# Patient Record
Sex: MALE | Race: WHITE | NOT HISPANIC OR LATINO
[De-identification: names, ages, dates, MRNs, and addresses within clinical notes are randomized per-mention and may not be internally consistent; named-entity substitution may affect disease eponyms.]

---

## 2017-06-16 ENCOUNTER — APPOINTMENT (OUTPATIENT)
Dept: NUCLEAR MEDICINE | Facility: CLINIC | Age: 77
End: 2017-06-16

## 2017-06-16 ENCOUNTER — OUTPATIENT (OUTPATIENT)
Dept: OUTPATIENT SERVICES | Facility: HOSPITAL | Age: 77
LOS: 1 days | End: 2017-06-16

## 2017-06-16 DIAGNOSIS — Z00.8 ENCOUNTER FOR OTHER GENERAL EXAMINATION: ICD-10-CM

## 2017-06-17 ENCOUNTER — OUTPATIENT (OUTPATIENT)
Dept: OUTPATIENT SERVICES | Facility: HOSPITAL | Age: 77
LOS: 1 days | End: 2017-06-17
Payer: MEDICARE

## 2017-06-17 ENCOUNTER — APPOINTMENT (OUTPATIENT)
Dept: CT IMAGING | Facility: CLINIC | Age: 77
End: 2017-06-17

## 2017-06-17 DIAGNOSIS — C61 MALIGNANT NEOPLASM OF PROSTATE: ICD-10-CM

## 2017-06-17 PROCEDURE — 74178 CT ABD&PLV WO CNTR FLWD CNTR: CPT

## 2017-06-17 PROCEDURE — 82565 ASSAY OF CREATININE: CPT

## 2017-12-22 ENCOUNTER — EMERGENCY (EMERGENCY)
Facility: HOSPITAL | Age: 77
LOS: 1 days | Discharge: HOME | End: 2017-12-22
Attending: EMERGENCY MEDICINE
Payer: MEDICARE

## 2017-12-22 VITALS
OXYGEN SATURATION: 98 % | RESPIRATION RATE: 18 BRPM | HEART RATE: 76 BPM | SYSTOLIC BLOOD PRESSURE: 211 MMHG | DIASTOLIC BLOOD PRESSURE: 90 MMHG | TEMPERATURE: 98 F

## 2017-12-22 VITALS — HEIGHT: 70 IN | WEIGHT: 229.94 LBS

## 2017-12-22 PROCEDURE — 73030 X-RAY EXAM OF SHOULDER: CPT

## 2017-12-22 PROCEDURE — 99283 EMERGENCY DEPT VISIT LOW MDM: CPT

## 2017-12-22 PROCEDURE — 99283 EMERGENCY DEPT VISIT LOW MDM: CPT | Mod: 25

## 2017-12-22 PROCEDURE — 73030 X-RAY EXAM OF SHOULDER: CPT | Mod: 26,LT

## 2017-12-22 RX ORDER — ACETAMINOPHEN 500 MG
975 TABLET ORAL ONCE
Qty: 0 | Refills: 0 | Status: COMPLETED | OUTPATIENT
Start: 2017-12-22 | End: 2017-12-22

## 2017-12-22 RX ADMIN — Medication 975 MILLIGRAM(S): at 12:19

## 2017-12-22 NOTE — ED STATDOCS - PROGRESS NOTE DETAILS
PA NOTE: Pt seen by intake physician and hpi/orders/plan reviewed. PT presenting to ED with complaints of left shoulder pain x 1 week.  Patient just finished 5 day course of steroids with temporary relief.  He states that he had a similar episode in the past for which he was given a local steroid injection which helped.  Denies injury.  PE: GEN: Awake, alert,  NAD,  EYES: PERRL CARDIAC: Reg rate and rhythm, S1,S2, RRR  RESP: No distress noted. Lungs CTA bilaterally no wheeze, ronchi, rales. ABD: soft,  non-tender, no guarding. .Musculoskeletal: no swelling or deformity, full ROM, non-tender NEURO: AOx3, no focal deficits   PLAN: f/u with ortho and pain management

## 2017-12-22 NOTE — ED ADULT NURSE NOTE - OBJECTIVE STATEMENT
pt c/o left shoulder pain/soreness, pt believes he has a recurrent bursitis to left shoulder. Pt states he requires NSAIDs and ice to relieve the [ain, reports "pins and needles" and reports symptoms for 1 week, denies fever

## 2017-12-22 NOTE — ED STATDOCS - OBJECTIVE STATEMENT
76 y/o M pt with hx of hernia presents to ED c/o constant left shoulder pain x 1 week. Went to [facility]D, and given Prednisone with no relief. Has had similar pain in past. Pain exacerbated when getting out of bed and mildly relieved with Advil. Exercises 3 times a week. Denies chest pain. No further complaints at this time. 78 y/o M pt with hx of hernia presents to ED c/o constant left shoulder pain x 1 week. Went to [facility] last week and was rx'd Prednisone for 5 days: reports no relief. Pain worse in the morning upon awakening and improves over the course of the day.  Also relieved with Advil but doesn't like to take. Denies chest pain. Exercises 3 times a week.  Has had similar pain in past. No further complaints at this time.

## 2017-12-22 NOTE — ED STATDOCS - MUSCULOSKELETAL, MLM
normal ROM. FROM yari shoulder. no localized bony tenderness of clavicle, scapula or acromion.  no deltoid tenderness. negative empty can sign.

## 2017-12-22 NOTE — ED ADULT TRIAGE NOTE - CHIEF COMPLAINT QUOTE
left shoulder pain x 6 days.  went to [facility] was given prednisone no relief and was told go to his doctor unable to get appt for 2 weeks

## 2017-12-22 NOTE — ED ADULT NURSE NOTE - CHIEF COMPLAINT QUOTE
left shoulder pain x 6 days.  went to [facility] was given prednisone no relief and was told go to his doctor unable to get appt for 2 weeks

## 2017-12-22 NOTE — ED STATDOCS - CARE PLAN
Principal Discharge DX:	Acute pain of left shoulder  Instructions for follow-up, activity and diet:	Sling to arm for comfort and avoid weight bearing with affected extremity for 1-2 weeks. Tylenol extra strength 2 tablets every 4 hours or Ibuprofen 600mg (3 tablets) every 6 hours as needed for aches, pains.  Return immediately to the ER for re-evaluation if your symptoms recur or worsening. otherwise, follow-up with ortho asap.

## 2017-12-22 NOTE — ED STATDOCS - PLAN OF CARE
Sling to arm for comfort and avoid weight bearing with affected extremity for 1-2 weeks. Tylenol extra strength 2 tablets every 4 hours or Ibuprofen 600mg (3 tablets) every 6 hours as needed for aches, pains.  Return immediately to the ER for re-evaluation if your symptoms recur or worsening. otherwise, follow-up with ortho asap.

## 2017-12-22 NOTE — ED STATDOCS - ATTENDING CONTRIBUTION TO CARE
I, Javon Wellington, performed the initial face to face bedside interview with this patient regarding history of present illness, review of symptoms and relevant past medical, social and family history.  I completed an independent physical examination.  I was the provider who initially evaluated this patient.  The history, relevant review of systems, past medical and surgical history, medical decision making, and physical examination was documented by the scribe in my presence and I attest to the accuracy of the documentation. Follow-up on ordered tests (ie labs, radiologic studies) and re-evaluation of the patient's status has been communicated to the ACP.  Disposition of the patient will be based on test outcome and response to ED interventions.

## 2018-01-02 ENCOUNTER — APPOINTMENT (OUTPATIENT)
Dept: ORTHOPEDIC SURGERY | Facility: CLINIC | Age: 78
End: 2018-01-02
Payer: MEDICARE

## 2018-01-02 VITALS
HEIGHT: 70 IN | DIASTOLIC BLOOD PRESSURE: 88 MMHG | TEMPERATURE: 98.8 F | HEART RATE: 71 BPM | BODY MASS INDEX: 33.79 KG/M2 | WEIGHT: 236 LBS | SYSTOLIC BLOOD PRESSURE: 193 MMHG

## 2018-01-02 DIAGNOSIS — M25.512 PAIN IN LEFT SHOULDER: ICD-10-CM

## 2018-01-02 PROCEDURE — 99204 OFFICE O/P NEW MOD 45 MIN: CPT | Mod: 25

## 2018-01-02 PROCEDURE — 20610 DRAIN/INJ JOINT/BURSA W/O US: CPT | Mod: LT

## 2018-01-18 ENCOUNTER — APPOINTMENT (OUTPATIENT)
Dept: ORTHOPEDIC SURGERY | Facility: CLINIC | Age: 78
End: 2018-01-18

## 2019-08-15 ENCOUNTER — APPOINTMENT (OUTPATIENT)
Dept: ULTRASOUND IMAGING | Facility: CLINIC | Age: 79
End: 2019-08-15
Payer: MEDICARE

## 2019-08-15 ENCOUNTER — OUTPATIENT (OUTPATIENT)
Dept: OUTPATIENT SERVICES | Facility: HOSPITAL | Age: 79
LOS: 1 days | End: 2019-08-15
Payer: MEDICARE

## 2019-08-15 DIAGNOSIS — Z00.00 ENCOUNTER FOR GENERAL ADULT MEDICAL EXAMINATION WITHOUT ABNORMAL FINDINGS: ICD-10-CM

## 2019-08-15 PROCEDURE — 76770 US EXAM ABDO BACK WALL COMP: CPT | Mod: 26

## 2019-08-15 PROCEDURE — 76770 US EXAM ABDO BACK WALL COMP: CPT

## 2019-08-29 ENCOUNTER — TRANSCRIPTION ENCOUNTER (OUTPATIENT)
Age: 79
End: 2019-08-29

## 2019-08-30 ENCOUNTER — APPOINTMENT (OUTPATIENT)
Dept: ORTHOPEDIC SURGERY | Facility: CLINIC | Age: 79
End: 2019-08-30
Payer: MEDICARE

## 2019-08-30 VITALS
WEIGHT: 236 LBS | BODY MASS INDEX: 33.79 KG/M2 | DIASTOLIC BLOOD PRESSURE: 78 MMHG | HEIGHT: 70 IN | HEART RATE: 82 BPM | SYSTOLIC BLOOD PRESSURE: 171 MMHG

## 2019-08-30 PROCEDURE — 73562 X-RAY EXAM OF KNEE 3: CPT | Mod: RT

## 2019-08-30 PROCEDURE — 99214 OFFICE O/P EST MOD 30 MIN: CPT | Mod: 25

## 2019-08-30 PROCEDURE — 20610 DRAIN/INJ JOINT/BURSA W/O US: CPT | Mod: RT

## 2019-08-30 NOTE — ADDENDUM
[FreeTextEntry1] : I, Harvey Morse, acted solely as a scribe for Dr. Elliot Gilbert on this date 08/30/2019.

## 2019-08-30 NOTE — PHYSICAL EXAM
[LE] : Sensory: Intact in bilateral lower extremities [ALL] : dorsalis pedis, posterior tibial, femoral, popliteal, and radial 2+ and symmetric bilaterally [Normal] : Alert and in no acute distress [Poor Appearance] : well-appearing [Antalgic] : not antalgic [Acute Distress] : not in acute distress [Obese] : not obese [de-identified] : GENERAL APPEARANCE: Well nourished and hydrated, pleasant, alert, and oriented x 3. Appears their stated age. \par HEENT: Normocephalic, extraocular eye motion intact. Nasal septum midline. Oral cavity clear. External auditory canal clear. \par RESPIRATORY: Breath sounds clear and audible in all lobes. No wheezing, No accessory muscle use.\par CARDIOVASCULAR: No apparent abnormalities. No lower leg edema. No varicosities. Pedal pulses are palpable.\par NEUROLOGIC: Sensation is normal, no muscle weakness in the upper or lower extremities.\par DERMATOLOGIC: No apparent skin lesions, moist, warm, no rash.\par SPINE: Cervical spine appears normal and moves freely; thoracic spine appears normal and moves freely; lumbosacral spine appears normal and moves freely, normal, nontender.\par MUSCULOSKELETAL: Hands, wrists, and elbows are normal and move freely, shoulders are normal and move freely.  [de-identified] : Right knee exam shows medial joint line tenderness, pain and crepitus with ROM, varus deformity present, preserved ROM.  [de-identified] : 3V Xray of the right knee done in office today and reviewed by Dr. Elliot Gilbert demonstrates bone-on-bone medial compartment osteoarthritis of the right knee

## 2019-08-30 NOTE — PROCEDURE
[de-identified] : I injected the patient's right knee today with cortisone.\par \par I discussed at length with the patient the planned steroid and lidocaine injection. The risks, benefits, convalescence and alternatives were reviewed. The possible side effects discussed included but were not limited to: pain, swelling, heat, bleeding, and redness. Symptoms are generally mild but if they are extensive then contact the office. Giving pain relievers by mouth such as NSAIDs or Tylenol can generally treat the reactions to steroid and lidocaine. Rare cases of infection have been noted. Rash, hives and itching may occur post injection. If you have muscle pain or cramps, flushing and or swelling of the face, rapid heart beat, nausea, dizziness, fever, chills, headache, difficulty breathing, swelling in the arms or legs, or have a prickly feeling of your skin, contact a health care provider immediately. Following this discussion, the knee was prepped with Alcohol and under sterile condition the 80 mg Depo-Medrol and 6 cc Lidocaine injection was performed with a 20 gauge needle through a superolateral injection site. The needle was introduced into the joint, aspiration was performed to ensure intra-articular placement and the medication was injected. Upon withdrawal of the needle the site was cleaned with alcohol and a band aid applied. The patient tolerated the injection well and there were no adverse effects. Post injection instructions included no strenuous activity for 24 hours, cryotherapy and if there are any adverse effects to contact the office. \par \par \par

## 2019-08-30 NOTE — HISTORY OF PRESENT ILLNESS
[Stable] : stable [Standing] : standing [Intermit.] : ~He/She~ states the symptoms seem to be intermittent [Bending] : worsened by bending [Recumbency] : relieved by recumbency [Walking] : worsened by walking [Rest] : relieved by rest [___ mths] : [unfilled] month(s) ago [de-identified] : hyaluronic acid injections [de-identified] : 78 year old male here for evaluation of chronic right knee pain. He states pain is chronic and worsening over the last 2 months. Pain is intermittent and localized to the medial aspect of the knee. Pain is worse with weightbearing and ambulating. He had Synvisc 4-5 years ago. Patient states took Mobic with relief however Nephro advised can not take due to kidney failure. patient had prostate CA that did met. to pelvis. He does exercise ~3 days per week in the gym.

## 2019-08-30 NOTE — DISCUSSION/SUMMARY
[de-identified] : 78 year old  male with bone-on-bone medial compartment osteoarthritis of the right knee. Based on imaging he is a candidate for right TKA, but will continue with conservative treatment for the time being. He elected to receive a cortisone injection in his right knee which he tolerated well. F/U 3 months for repeat cortisone injection if needed. He may also consider hyaluronic acid injections in the future based on response to cortisone injection today. \par \par A knee replacement means resurfacing of all 3 surfaces of the patella, the femur, and tibia with metal and plastic parts. The prosthetic parts are usually cemented into position and well outpatient range of motion from full extension to about 120° of flexion. The postoperative motion, however, is determined by multiple factors, the most important of which is preoperative motion. In general, the better motion preoperatively, the better the motion postoperatively. The operation, pending medical clearance, gently requires hospitalization of 3-4 days for one knee, 5-7 days for bilateral knee replacements. In general, we prefer to perform the procedure under spinal anesthesia with femoral nerve block and occasional single shot sciatic nerve block. We may ask a patient to give 2 units of blood for bilateral total knee arthroplasties, for one knee we institute a normogram which may include administration of preoperative Procrit. The operative procedure takes probably 1-2 hours. The operation requires a straight incision anywhere from 5-7 inches down from the knee. Postoperatively, the patient is positioned in a continuous passive motion machine within the first 24 hours and is walking the day after surgery. The first couple days are very painful and the pain medication will alleviate, but not eliminate the pain. The patient must really push hard to get range of motion. Our goal for having a person go home as that the range of motion is approximately 0-90° of flexion and that they can walk with a walker or cane. A walking aid is to be dispensed once the patient is secure enough. In general there, there is no cast or brace required with routine knee replacement. In the long term, we do not encourage our patients to run for the sake of running, although pending their preoperative status, we often allow patient to play doubles tennis or comparative activities. We also allowed them to do gentle intermediate downhill skiing if they are truly an expert skier. Biking is encouraged as well swimming. The followup periods are usually 3 weeks, 6 weeks, 3 months, and yearly intervals. Potential complications with total knee replacement included anesthetic complications and death, infection around 1%, nerve damage, by which means peroneal nerve palsy, footdrop or flapping foot with ambulation. This is particularly more apt to occur in the patient with a valgus or knock-knee deformity. The incidence can be quite high in this particular patient population. There will be areas of skin numbness, but this is not an untoward effect nor do we consider it a complication. Other potential complications include dislocation of the patella component, usually less than 2%; loosening of the tibial or femoral component is much more infrequent. Most often this occurs with infection or long-term use. Patient of extreme risk including markedly overweight patient's may be more prone to prosthetic wear. Major blood vessel damage is also extremely rare. Directly because of the anatomic proximity of the popliteal artery this could be lacerated with subsequent repair required. Be it unlikely, disruption of the popliteal artery could theoretically result in amputation. Similarly, infection could theoretically result in amputation if one were to grow out of an organism that cannot be controlled with antibiotics. General medical complications include phlebitis, for which we would prophylactically anticoagulate patients, but could still occur, and fatal pulmonary embolus which has been reported. Cardiovascular problems, such as heart attack or ischemia are always a concern with such hemodynamic changes in the blood vascular system. Other General complications are rare, but anything medicine could theoretically happen. I think the patient understands the risk benefit ratio of total knee replacement and will think about whether there like to pursue with an operation or nonoperative treatment program. I reviewed the plan of care as well as a model of a total knee implant equivalent to the one that will be used for their total knee joint replacement. The patient agreed to the plan of care as well as the use of implants for their knee total joint replacement. \par

## 2019-12-09 ENCOUNTER — OTHER (OUTPATIENT)
Age: 79
End: 2019-12-09

## 2020-05-05 ENCOUNTER — APPOINTMENT (OUTPATIENT)
Dept: ORTHOPEDIC SURGERY | Facility: CLINIC | Age: 80
End: 2020-05-05
Payer: MEDICARE

## 2020-05-05 PROCEDURE — 99214 OFFICE O/P EST MOD 30 MIN: CPT | Mod: 25

## 2020-05-05 PROCEDURE — 20610 DRAIN/INJ JOINT/BURSA W/O US: CPT | Mod: RT

## 2020-05-05 NOTE — HISTORY OF PRESENT ILLNESS
[Worsening] : worsening [Walking] : worsened by walking [Intermit.] : ~He/She~ states the symptoms seem to be intermittent [Recumbency] : relieved by recumbency [Rest] : relieved by rest [de-identified] : 79 year old male here for follow-up evaluation of right knee pain. He has known bone-on-bone right knee osteoarthritis. \par He had a right knee cortisone injection at his last visit with us in August 2019 which provided about 2 weeks of pain relief. He continues to have pain with walking.\par He is interested in beginning right knee hyaluronic acid injections.  [de-identified] : cortisone injection

## 2020-05-05 NOTE — ADDENDUM
[FreeTextEntry1] : I, Harvey Morse, acted solely as a scribe for Dr. Elliot Gilbert on this date 05/05/2020.

## 2020-05-05 NOTE — PROCEDURE
[de-identified] : I injected the patient's right knee today with Euflexxa Lot: [id number]: Expiration 2020/12/15\par \par Knee injection visco-supplementation: I discussed at length with the patient the planned steroid and lidocaine injection for primary osteoarthritis. The risks, benefits, convalescence and alternatives were reviewed and pt consented for injection. The possible side effects discussed included but were not limited to: pain, swelling, heat and redness. There symptoms are generally mild but if they are extensive then contact the office. Giving pain relievers by mouth such as NSAIDs or Tylenol can generally treat the reactions to injection. Rare cases of infection have been noted. Rash, hives and itching may occur post injection. If you have muscle pain or cramps, flushing and or swelling of the face, rapid heart beat, nausea, dizziness, fever, chills, headache, difficulty breathing, swelling in the arms or legs, or have a prickly feeling of your skin, contact a health care provider immediately. Following this discussion, the knee was prepped with alcohol and under sterile condition the injection was performed through a superolateral injection site with a 20 gauge needle. The needle was introduced into the joint, aspiration was performed to ensure intra-articular placement and the medication was injected. Upon withdrawal of the needle the site was cleaned with alcohol and a band aid applied. The patient tolerated the injection well and there were no adverse effects. Post injection instructions included no strenuous activity for 24 hours, cryotherapy and if there are any adverse effects to contact the office. \par \par \par

## 2020-05-05 NOTE — PHYSICAL EXAM
[Normal] : Gait: normal [LE] : Sensory: Intact in bilateral lower extremities [ALL] : Biceps, brachioradialis, triceps, patellar, ankle and plantar 2+ and symmetric bilaterally [Antalgic] : not antalgic [de-identified] : GENERAL APPEARANCE: Well nourished and hydrated, pleasant, alert, and oriented x 3. Appears their stated age. \par HEENT: Normocephalic, extraocular eye motion intact. Nasal septum midline. Oral cavity clear. External auditory canal clear. \par RESPIRATORY: Breath sounds clear and audible in all lobes. No wheezing, No accessory muscle use.\par CARDIOVASCULAR: No apparent abnormalities. No lower leg edema. No varicosities. Pedal pulses are palpable.\par NEUROLOGIC: Sensation is normal, no muscle weakness in the upper or lower extremities.\par DERMATOLOGIC: No apparent skin lesions, moist, warm, no rash.\par SPINE: Cervical spine appears normal and moves freely; thoracic spine appears normal and moves freely; lumbosacral spine appears normal and moves freely, normal, nontender.\par MUSCULOSKELETAL: Hands, wrists, and elbows are normal and move freely, shoulders are normal and move freely.  [de-identified] : Right knee exam shows medial joint line tenderness, pain and crepitus with ROM, varus deformity present, preserved ROM. \par

## 2020-05-05 NOTE — REASON FOR VISIT
[Follow-Up Visit] : a follow-up visit for [FreeTextEntry2] : right knee pain. Euflexxa injx. #1 LOT: [id number] EXP: 2020/12/15.

## 2020-05-05 NOTE — DISCUSSION/SUMMARY
[de-identified] : 79 year old male with bone-on-bone medial compartment osteoarthritis of the right knee. Based on imaging he is a candidate for right TKA, but will continue with conservative treatment for the time being. Today he elected to receive right knee Euflexxa injection 1/3. He will follow-up in 1 week for right knee Euflexxa injection 2/3. \par \par The patient is a 79 year old individual with end stage arthritis of their right knee joint. Based upon the patient's continued symptoms and failure to respond to conservative treatment I have recommended a right total knee arthroplasty for this patient. A long discussion took place with the patient describing what a total joint replacement is and what the procedure would entail. A total knee arthroplasty model, similar to the implant that will be used during the operation, was utilized to demonstrate and to discuss the various bearing surfaces of the implants. The hospitalization and post-operative care and rehabilitation were also discussed. The use of perioperative antibiotics and DVT prophylaxis were discussed. The risk, benefits and alternatives to a surgical intervention were discussed at length with the patient. The patient was also advised of risks related to the medical comorbidities and elevated body mass index (BMI).  A lengthy discussion took place to review the most common complications including but not limited to: deep vein thrombosis, pulmonary embolus, heart attack, stroke, infection, wound breakdown, numbness, damage to nerves, tendon, muscles, arteries or other blood vessels, death and other possible complications from anesthesia. The patient was told that we will take steps to minimize these risks by using sterile technique, antibiotics and DVT prophylaxis when appropriate and follow the patient postoperatively in the office setting to monitor progress. The possibility of recurrent pain, no improvement in pain and actual worsening of pain were also discussed with the patient.\par The discharge plan of care focused on the patient going home following surgery.  The patient was encouraged to make the necessary arrangements to have someone stay with them when they are discharged home.  Following discharge, a home care nurse will visit the patient.  The home care nurse will open your home care case and request home physical therapy services.  Home physical therapy will commence following discharge provided it is appropriate and covered by the health insurance benefit plan. \par The benefits of surgery were discussed with the patient including the potential for improving his/her current clinical condition through operative intervention. Alternatives to surgical intervention including continued conservative management were also discussed in detail. All questions were answered to the satisfaction of the patient. The treatment plan of care, as well as a model of a total knee arthroplasty equivalent to the one that will be used for their total joint replacement, was shared with the patient.  The patient agreed to the plan of care as well as the use of implants in their total joint replacement. \par

## 2020-05-12 ENCOUNTER — APPOINTMENT (OUTPATIENT)
Dept: ORTHOPEDIC SURGERY | Facility: CLINIC | Age: 80
End: 2020-05-12
Payer: MEDICARE

## 2020-05-12 VITALS — HEART RATE: 74 BPM | DIASTOLIC BLOOD PRESSURE: 74 MMHG | SYSTOLIC BLOOD PRESSURE: 158 MMHG

## 2020-05-12 PROCEDURE — 20610 DRAIN/INJ JOINT/BURSA W/O US: CPT | Mod: LT

## 2020-05-12 NOTE — PROCEDURE
[de-identified] : I injected the patient's right knee today with Euflexxa Lot: [id number]: Expiration 2020/12/15\par \par Knee injection visco-supplementation: I discussed at length with the patient the planned steroid and lidocaine injection for primary osteoarthritis. The risks, benefits, convalescence and alternatives were reviewed and pt consented for injection. The possible side effects discussed included but were not limited to: pain, swelling, heat and redness. There symptoms are generally mild but if they are extensive then contact the office. Giving pain relievers by mouth such as NSAIDs or Tylenol can generally treat the reactions to injection. Rare cases of infection have been noted. Rash, hives and itching may occur post injection. If you have muscle pain or cramps, flushing and or swelling of the face, rapid heart beat, nausea, dizziness, fever, chills, headache, difficulty breathing, swelling in the arms or legs, or have a prickly feeling of your skin, contact a health care provider immediately. Following this discussion, the knee was prepped with alcohol and under sterile condition the injection was performed through a superolateral injection site with a 20 gauge needle. The needle was introduced into the joint, aspiration was performed to ensure intra-articular placement and the medication was injected. Upon withdrawal of the needle the site was cleaned with alcohol and a band aid applied. The patient tolerated the injection well and there were no adverse effects. Post injection instructions included no strenuous activity for 24 hours, cryotherapy and if there are any adverse effects to contact the office. \par \par \par

## 2020-05-12 NOTE — ADDENDUM
[FreeTextEntry1] : I, Harvey Morse, acted solely as a scribe for Dr. Elliot Gilbert on this date 05/12/2020.

## 2020-05-12 NOTE — HISTORY OF PRESENT ILLNESS
[Worsening] : worsening [Intermit.] : ~He/She~ states the symptoms seem to be intermittent [Walking] : worsened by walking [Recumbency] : relieved by recumbency [Rest] : relieved by rest [de-identified] : 79 year old male here for follow-up evaluation of right knee pain. He has known bone-on-bone right knee osteoarthritis. \par At his last visit with us he had right knee Euflexxa injection 1 of 3. He presents today for Euflexxa injection 2 of 3.  [de-identified] : cortisone injection

## 2020-05-12 NOTE — REASON FOR VISIT
[Follow-Up Visit] : a follow-up visit for [FreeTextEntry2] : right knee pain, Euflexxa injx. # 2 LOT: [id number] EXP: 2020/12/15.

## 2020-05-12 NOTE — PHYSICAL EXAM
[Normal] : Gait: normal [LE] : Sensory: Intact in bilateral lower extremities [ALL] : dorsalis pedis, posterior tibial, femoral, popliteal, and radial 2+ and symmetric bilaterally [Antalgic] : not antalgic [de-identified] : GENERAL APPEARANCE: Well nourished and hydrated, pleasant, alert, and oriented x 3. Appears their stated age. \par HEENT: Normocephalic, extraocular eye motion intact. Nasal septum midline. Oral cavity clear. External auditory canal clear. \par RESPIRATORY: Breath sounds clear and audible in all lobes. No wheezing, No accessory muscle use.\par CARDIOVASCULAR: No apparent abnormalities. No lower leg edema. No varicosities. Pedal pulses are palpable.\par NEUROLOGIC: Sensation is normal, no muscle weakness in the upper or lower extremities.\par DERMATOLOGIC: No apparent skin lesions, moist, warm, no rash.\par SPINE: Cervical spine appears normal and moves freely; thoracic spine appears normal and moves freely; lumbosacral spine appears normal and moves freely, normal, nontender.\par MUSCULOSKELETAL: Hands, wrists, and elbows are normal and move freely, shoulders are normal and move freely.  [de-identified] : Right knee exam shows medial joint line tenderness, pain and crepitus with ROM, varus deformity present, preserved ROM. \par

## 2020-05-12 NOTE — DISCUSSION/SUMMARY
[de-identified] : 79 year old male with bone-on-bone medial compartment osteoarthritis of the right knee. Based on imaging he is a candidate for right TKA, but will continue with conservative treatment for the time being. Today he elected to receive right knee Euflexxa injection 2/3. He will follow-up in 1 week for right knee Euflexxa injection 3/3. \par \par The patient is a 79 year old individual with end stage arthritis of their right knee joint. Based upon the patient's continued symptoms and failure to respond to conservative treatment I have recommended a right total knee arthroplasty for this patient. A long discussion took place with the patient describing what a total joint replacement is and what the procedure would entail. A total knee arthroplasty model, similar to the implant that will be used during the operation, was utilized to demonstrate and to discuss the various bearing surfaces of the implants. The hospitalization and post-operative care and rehabilitation were also discussed. The use of perioperative antibiotics and DVT prophylaxis were discussed. The risk, benefits and alternatives to a surgical intervention were discussed at length with the patient. The patient was also advised of risks related to the medical comorbidities and elevated body mass index (BMI).  A lengthy discussion took place to review the most common complications including but not limited to: deep vein thrombosis, pulmonary embolus, heart attack, stroke, infection, wound breakdown, numbness, damage to nerves, tendon, muscles, arteries or other blood vessels, death and other possible complications from anesthesia. The patient was told that we will take steps to minimize these risks by using sterile technique, antibiotics and DVT prophylaxis when appropriate and follow the patient postoperatively in the office setting to monitor progress. The possibility of recurrent pain, no improvement in pain and actual worsening of pain were also discussed with the patient.\par The discharge plan of care focused on the patient going home following surgery.  The patient was encouraged to make the necessary arrangements to have someone stay with them when they are discharged home.  Following discharge, a home care nurse will visit the patient.  The home care nurse will open your home care case and request home physical therapy services.  Home physical therapy will commence following discharge provided it is appropriate and covered by the health insurance benefit plan. \par The benefits of surgery were discussed with the patient including the potential for improving his/her current clinical condition through operative intervention. Alternatives to surgical intervention including continued conservative management were also discussed in detail. All questions were answered to the satisfaction of the patient. The treatment plan of care, as well as a model of a total knee arthroplasty equivalent to the one that will be used for their total joint replacement, was shared with the patient.  The patient agreed to the plan of care as well as the use of implants in their total joint replacement. \par

## 2020-05-19 ENCOUNTER — APPOINTMENT (OUTPATIENT)
Dept: ORTHOPEDIC SURGERY | Facility: CLINIC | Age: 80
End: 2020-05-19
Payer: MEDICARE

## 2020-05-19 VITALS — DIASTOLIC BLOOD PRESSURE: 77 MMHG | SYSTOLIC BLOOD PRESSURE: 156 MMHG | HEART RATE: 80 BPM

## 2020-05-19 PROCEDURE — 20610 DRAIN/INJ JOINT/BURSA W/O US: CPT

## 2020-05-19 NOTE — DISCUSSION/SUMMARY
[de-identified] : 79 year old male with bone-on-bone medial compartment osteoarthritis of the right knee. Based on imaging he is a candidate for right TKA, but will continue with conservative treatment for the time being. Today he elected to receive right knee Euflexxa injection 3/3. He will follow-up with us in 3 months, at which time he may consider bilateral knee cortisone injections.\par \par The patient is a 79 year old individual with end stage arthritis of their right knee joint. Based upon the patient's continued symptoms and failure to respond to conservative treatment I have recommended a right total knee arthroplasty for this patient. A long discussion took place with the patient describing what a total joint replacement is and what the procedure would entail. A total knee arthroplasty model, similar to the implant that will be used during the operation, was utilized to demonstrate and to discuss the various bearing surfaces of the implants. The hospitalization and post-operative care and rehabilitation were also discussed. The use of perioperative antibiotics and DVT prophylaxis were discussed. The risk, benefits and alternatives to a surgical intervention were discussed at length with the patient. The patient was also advised of risks related to the medical comorbidities and elevated body mass index (BMI).  A lengthy discussion took place to review the most common complications including but not limited to: deep vein thrombosis, pulmonary embolus, heart attack, stroke, infection, wound breakdown, numbness, damage to nerves, tendon, muscles, arteries or other blood vessels, death and other possible complications from anesthesia. The patient was told that we will take steps to minimize these risks by using sterile technique, antibiotics and DVT prophylaxis when appropriate and follow the patient postoperatively in the office setting to monitor progress. The possibility of recurrent pain, no improvement in pain and actual worsening of pain were also discussed with the patient.\par The discharge plan of care focused on the patient going home following surgery.  The patient was encouraged to make the necessary arrangements to have someone stay with them when they are discharged home.  Following discharge, a home care nurse will visit the patient.  The home care nurse will open your home care case and request home physical therapy services.  Home physical therapy will commence following discharge provided it is appropriate and covered by the health insurance benefit plan. \par The benefits of surgery were discussed with the patient including the potential for improving his/her current clinical condition through operative intervention. Alternatives to surgical intervention including continued conservative management were also discussed in detail. All questions were answered to the satisfaction of the patient. The treatment plan of care, as well as a model of a total knee arthroplasty equivalent to the one that will be used for their total joint replacement, was shared with the patient.  The patient agreed to the plan of care as well as the use of implants in their total joint replacement. \par

## 2020-05-19 NOTE — PROCEDURE
[de-identified] : I injected the patient's right knee today with Euflexxa Lot: [id number]: Expiration 2020/12/15\par \par Knee injection visco-supplementation: I discussed at length with the patient the planned steroid and lidocaine injection for primary osteoarthritis. The risks, benefits, convalescence and alternatives were reviewed and pt consented for injection. The possible side effects discussed included but were not limited to: pain, swelling, heat and redness. There symptoms are generally mild but if they are extensive then contact the office. Giving pain relievers by mouth such as NSAIDs or Tylenol can generally treat the reactions to injection. Rare cases of infection have been noted. Rash, hives and itching may occur post injection. If you have muscle pain or cramps, flushing and or swelling of the face, rapid heart beat, nausea, dizziness, fever, chills, headache, difficulty breathing, swelling in the arms or legs, or have a prickly feeling of your skin, contact a health care provider immediately. Following this discussion, the knee was prepped with alcohol and under sterile condition the injection was performed through a superolateral injection site with a 20 gauge needle. The needle was introduced into the joint, aspiration was performed to ensure intra-articular placement and the medication was injected. Upon withdrawal of the needle the site was cleaned with alcohol and a band aid applied. The patient tolerated the injection well and there were no adverse effects. Post injection instructions included no strenuous activity for 24 hours, cryotherapy and if there are any adverse effects to contact the office. \par \par \par

## 2020-05-19 NOTE — ADDENDUM
[FreeTextEntry1] : I, Harvey Morse, acted solely as a scribe for Dr. Elliot Gilbert on this date 05/19/2020.

## 2020-05-19 NOTE — REASON FOR VISIT
[Follow-Up Visit] : a follow-up visit for [FreeTextEntry2] : right knee pain, Euflexxa injx. #3 LOT: [id number] EXP: 2020/12/15.

## 2020-05-19 NOTE — HISTORY OF PRESENT ILLNESS
[Worsening] : worsening [Intermit.] : ~He/She~ states the symptoms seem to be intermittent [Rest] : relieved by rest [Recumbency] : relieved by recumbency [Walking] : worsened by walking [de-identified] : 79 year old male here for follow-up evaluation of right knee pain. He has known bone-on-bone right knee osteoarthritis. \par At his last visit with us he had right knee Euflexxa injection 2 of 3. He presents today for Euflexxa injection 3 of 3.  [de-identified] : cortisone injection

## 2020-05-19 NOTE — PHYSICAL EXAM
[Normal] : Gait: normal [LE] : Sensory: Intact in bilateral lower extremities [ALL] : Biceps, brachioradialis, triceps, patellar, ankle and plantar 2+ and symmetric bilaterally [Antalgic] : not antalgic [de-identified] : Right knee exam shows medial joint line tenderness, pain and crepitus with ROM, varus deformity present, preserved ROM. \par  [de-identified] : GENERAL APPEARANCE: Well nourished and hydrated, pleasant, alert, and oriented x 3. Appears their stated age. \par HEENT: Normocephalic, extraocular eye motion intact. Nasal septum midline. Oral cavity clear. External auditory canal clear. \par RESPIRATORY: Breath sounds clear and audible in all lobes. No wheezing, No accessory muscle use.\par CARDIOVASCULAR: No apparent abnormalities. No lower leg edema. No varicosities. Pedal pulses are palpable.\par NEUROLOGIC: Sensation is normal, no muscle weakness in the upper or lower extremities.\par DERMATOLOGIC: No apparent skin lesions, moist, warm, no rash.\par SPINE: Cervical spine appears normal and moves freely; thoracic spine appears normal and moves freely; lumbosacral spine appears normal and moves freely, normal, nontender.\par MUSCULOSKELETAL: Hands, wrists, and elbows are normal and move freely, shoulders are normal and move freely.

## 2020-08-19 ENCOUNTER — APPOINTMENT (OUTPATIENT)
Dept: ORTHOPEDIC SURGERY | Facility: CLINIC | Age: 80
End: 2020-08-19
Payer: MEDICARE

## 2020-08-19 VITALS
HEART RATE: 83 BPM | DIASTOLIC BLOOD PRESSURE: 77 MMHG | BODY MASS INDEX: 33.79 KG/M2 | SYSTOLIC BLOOD PRESSURE: 196 MMHG | WEIGHT: 236 LBS | HEIGHT: 70 IN

## 2020-08-19 PROCEDURE — 20610 DRAIN/INJ JOINT/BURSA W/O US: CPT | Mod: RT

## 2020-08-19 PROCEDURE — 99214 OFFICE O/P EST MOD 30 MIN: CPT | Mod: 25

## 2020-08-19 NOTE — PROCEDURE
[de-identified] : I injected the patient's right knee today with cortisone.\par \par I discussed at length with the patient the planned steroid and lidocaine injection. The risks, benefits, convalescence and alternatives were reviewed. The possible side effects discussed included but were not limited to: pain, swelling, heat, bleeding, and redness. Symptoms are generally mild but if they are extensive then contact the office. Giving pain relievers by mouth such as NSAIDs or Tylenol can generally treat the reactions to steroid and lidocaine. Rare cases of infection have been noted. Rash, hives and itching may occur post injection. If you have muscle pain or cramps, flushing and or swelling of the face, rapid heart beat, nausea, dizziness, fever, chills, headache, difficulty breathing, swelling in the arms or legs, or have a prickly feeling of your skin, contact a health care provider immediately. Following this discussion, the knee was prepped with Alcohol and under sterile condition the 80 mg Depo-Medrol and 6 cc Lidocaine injection was performed with a 20 gauge needle through a superolateral injection site. The needle was introduced into the joint, aspiration was performed to ensure intra-articular placement and the medication was injected. Upon withdrawal of the needle the site was cleaned with alcohol and a band aid applied. The patient tolerated the injection well and there were no adverse effects. Post injection instructions included no strenuous activity for 24 hours, cryotherapy and if there are any adverse effects to contact the office. \par \par

## 2020-08-19 NOTE — DISCUSSION/SUMMARY
[de-identified] : 79 year old male with bone-on-bone medial compartment osteoarthritis of the right knee. Based on imaging he is a candidate for right TKA, but will continue with conservative treatment for the time being. He got a steroid injection toady and we discussed TKA. He will follow-up with us in 3 months, at which time he may consider bilateral knee cortisone injections.\par \par The patient is a 79 year old individual with end stage arthritis of their right knee joint. Based upon the patient's continued symptoms and failure to respond to conservative treatment I have recommended a right total knee arthroplasty for this patient. A long discussion took place with the patient describing what a total joint replacement is and what the procedure would entail. A total knee arthroplasty model, similar to the implant that will be used during the operation, was utilized to demonstrate and to discuss the various bearing surfaces of the implants. The hospitalization and post-operative care and rehabilitation were also discussed. The use of perioperative antibiotics and DVT prophylaxis were discussed. The risk, benefits and alternatives to a surgical intervention were discussed at length with the patient. The patient was also advised of risks related to the medical comorbidities and elevated body mass index (BMI).  A lengthy discussion took place to review the most common complications including but not limited to: deep vein thrombosis, pulmonary embolus, heart attack, stroke, infection, wound breakdown, numbness, damage to nerves, tendon, muscles, arteries or other blood vessels, death and other possible complications from anesthesia. The patient was told that we will take steps to minimize these risks by using sterile technique, antibiotics and DVT prophylaxis when appropriate and follow the patient postoperatively in the office setting to monitor progress. The possibility of recurrent pain, no improvement in pain and actual worsening of pain were also discussed with the patient.\par The discharge plan of care focused on the patient going home following surgery.  The patient was encouraged to make the necessary arrangements to have someone stay with them when they are discharged home.  Following discharge, a home care nurse will visit the patient.  The home care nurse will open your home care case and request home physical therapy services.  Home physical therapy will commence following discharge provided it is appropriate and covered by the health insurance benefit plan. \par The benefits of surgery were discussed with the patient including the potential for improving his/her current clinical condition through operative intervention. Alternatives to surgical intervention including continued conservative management were also discussed in detail. All questions were answered to the satisfaction of the patient. The treatment plan of care, as well as a model of a total knee arthroplasty equivalent to the one that will be used for their total joint replacement, was shared with the patient.  The patient agreed to the plan of care as well as the use of implants in their total joint replacement. \par

## 2020-08-19 NOTE — PHYSICAL EXAM
[Normal] : Gait: normal [LE] : Sensory: Intact in bilateral lower extremities [ALL] : dorsalis pedis, posterior tibial, femoral, popliteal, and radial 2+ and symmetric bilaterally [de-identified] : GENERAL APPEARANCE: Well nourished and hydrated, pleasant, alert, and oriented x 3. Appears their stated age. \par HEENT: Normocephalic, extraocular eye motion intact. Nasal septum midline. Oral cavity clear. External auditory canal clear. \par RESPIRATORY: Breath sounds clear and audible in all lobes. No wheezing, No accessory muscle use.\par CARDIOVASCULAR: No apparent abnormalities. No lower leg edema. No varicosities. Pedal pulses are palpable.\par NEUROLOGIC: Sensation is normal, no muscle weakness in the upper or lower extremities.\par DERMATOLOGIC: No apparent skin lesions, moist, warm, no rash.\par SPINE: Cervical spine appears normal and moves freely; thoracic spine appears normal and moves freely; lumbosacral spine appears normal and moves freely, normal, nontender.\par MUSCULOSKELETAL: Hands, wrists, and elbows are normal and move freely, shoulders are normal and move freely.  [Antalgic] : not antalgic [de-identified] : Right knee exam shows medial joint line tenderness, pain and crepitus with ROM, varus deformity present, preserved ROM. \par

## 2020-08-19 NOTE — HISTORY OF PRESENT ILLNESS
[Intermit.] : ~He/She~ states the symptoms seem to be intermittent [Worsening] : worsening [Rest] : relieved by rest [Recumbency] : relieved by recumbency [Walking] : worsened by walking [de-identified] : cortisone injection [de-identified] : 79 year old male here for follow-up evaluation of right knee pain. He has known bone-on-bone right knee osteoarthritis. He had Euflexxa injections and cortisone injections in May 2020. patient states no relief with medication.  Pain is intermittent and localized to the medial aspect of the knee. Pain is worse with weightbearing and ambulating. He had Synvisc 4-5 years ago. Patient states took Mobic with relief however Nephro advised can not take due to kidney failure. patient had prostate CA that did met. to pelvis. He does exercise ~3 days per week in the gym. \par

## 2020-08-19 NOTE — END OF VISIT
[FreeTextEntry3] : I, Willie Sotelo, acted solely as a scribe for Dr. Elliot Gilbert on this date 08/19/2020.

## 2020-12-02 ENCOUNTER — APPOINTMENT (OUTPATIENT)
Dept: ORTHOPEDIC SURGERY | Facility: CLINIC | Age: 80
End: 2020-12-02

## 2021-02-11 NOTE — ED STATDOCS - CPE ED NEURO NORM
Occupational Therapy Daily Progress Note Entered On:  9/2/2020 10:16     Performed On:  9/2/2020 10:16  by Ann Emerson               Musculoskeletal Assessment   Motor Skills   Fine Motor Skills, Left :   WFL   Fine Motor Skills, Right :   WFL   Ann Emerson - 9/2/2020 10:17    Rolling to Left - Functional Mobility   Sitting to Standing :   Contact guard   Standing to Sitting :   Mod ind   (Comment: with verbal cues to grab arm rest [Ann Emerson - 9/2/2020 10:17 ] )   Ann Emerson - 9/2/2020 10:17    Hygiene :   Mod ind   (Comment: in standing [Ann Emerson - 9/2/2020 10:17 ] )   Toilet Transfer :   Ind   Ann Emerson - 9/2/2020 10:17    Gait Grid   Assistive Device :    Rolling walker - standard              Level of Assistance :    CGA                Ann Emerson - 9/2/2020 10:17          Endurance/Activity Level :   Good   CVA Patient :   No   Ann Emerson 9/2/2020 10:17    Balance   [facility] Balance Scale Performed - Standing :   Yes   [facility] Standing Balance Scale :   4 = Independently moves and returns center of gravity 1-2 inches in 1 plane   Ann Emerson - 9/2/2020 10:17    Treatments and Interventions   Treatments and Interventions Checklist :   Therapeutic transfer training, ADL self care training   Patient/Family Education :   Yes   Ann Emerson - 9/2/2020 10:17    Education   General Patient Education [employer]   Topics :    ADL's              Individuals Taught :    Patient                Ann Emerson 9/2/2020 10:17          Preferred Communication Mode :   Verbal   Language for Written Material :   English   Ann Emerson - 9/2/2020 10:17    Assessment and Goals   Rehab Potential :   Good   OT Plan/Recommendations :   Transfer training, ADL self care training   Frequency :   Daily x 3 days per week   Location of OT Intervention :   Bedside   Assessment :   Pt admitted with new onset CHF. Pt with h/o HTN, cardiac stents, CVA, dementia, breast  CA, tongue CA and Afib.   Pt seen per goals set; Pt in recliner upon arrival, per RN, ok to treat;  Max A to prep pt out of recliner, ie, push down LE support on recliner and manage lines; donned gait belt and pt used rw to assist to stand; CGA sit to stand from recliner; amb to br with some visual difficulty, however, pt aware, and managed the rw to get passed the door without hitting the door trim,  Toilet transfer independent w/o use of grab bar on descent; toilet hygiene in standing mod I with therapist holding wipes ready for pt to grab; amb up to sink in standing, handwashing and oral care independent, without use of rw or sink for support, in fact, had to remind pt to bring rw as amb out of br; pt returned to recliner with reminder to reach for arm rest for safety, cga    37mins, adl3    **AM-PAC 6 clicks daily activity raw score 22/24; t-score 47.10       Ann Emerson - 9/2/2020 10:17    Follow-Up OT Recommendations :   To be determined   Additional OT Recommendations :   Pt needs 24 hour nonskilled assistance with ADLs and mobility   Ann Emerson - 9/2/2020 10:16    Ann Emerson - 9/2/2020 10:16    OT Goals Grid   Problem :    decreased ADLs   decreased fxnl mobility           Patient will... :    **demo toileting with mod I using comp tech for vision   **demo toilet t/f with mod I using grab bar PRN           Timeframe :    3-4 days   3-4 days           Comment  (Comment: met this date [Ann Emerson 9/2/2020 10:17 ] )  (Comment: met without use of grab bar on descent [Ann Emerson - 9/2/2020 10:17 ] )        Ann Emerson - 9/2/2020 10:17   Ann Emerson - 9/2/2020 10:17                 normal...

## 2021-02-12 ENCOUNTER — APPOINTMENT (OUTPATIENT)
Dept: ORTHOPEDIC SURGERY | Facility: CLINIC | Age: 81
End: 2021-02-12
Payer: MEDICARE

## 2021-02-12 VITALS
DIASTOLIC BLOOD PRESSURE: 78 MMHG | WEIGHT: 236 LBS | SYSTOLIC BLOOD PRESSURE: 166 MMHG | BODY MASS INDEX: 33.79 KG/M2 | HEIGHT: 70 IN | HEART RATE: 109 BPM

## 2021-02-12 PROCEDURE — 99213 OFFICE O/P EST LOW 20 MIN: CPT | Mod: 25

## 2021-02-12 PROCEDURE — 99072 ADDL SUPL MATRL&STAF TM PHE: CPT

## 2021-02-12 PROCEDURE — 20610 DRAIN/INJ JOINT/BURSA W/O US: CPT | Mod: RT

## 2021-02-12 RX ORDER — HYLAN G-F 20 16MG/2ML
16 SYRINGE (ML) INTRAARTICULAR
Qty: 6 | Refills: 0 | Status: ACTIVE | OUTPATIENT
Start: 2019-12-09

## 2021-02-12 NOTE — PROCEDURE
[de-identified] : I injected the patient's right knee today with cortisone.\par \par I discussed at length with the patient the planned steroid and lidocaine injection. The risks, benefits, convalescence and alternatives were reviewed. The possible side effects discussed included but were not limited to: pain, swelling, heat, bleeding, and redness. Symptoms are generally mild but if they are extensive then contact the office. Giving pain relievers by mouth such as NSAIDs or Tylenol can generally treat the reactions to steroid and lidocaine. Rare cases of infection have been noted. Rash, hives and itching may occur post injection. If you have muscle pain or cramps, flushing and or swelling of the face, rapid heart beat, nausea, dizziness, fever, chills, headache, difficulty breathing, swelling in the arms or legs, or have a prickly feeling of your skin, contact a health care provider immediately. Following this discussion, the knee was prepped with Alcohol and under sterile condition the 80 mg Depo-Medrol and 6 cc Lidocaine injection was performed with a 20 gauge needle through a superolateral injection site. The needle was introduced into the joint, aspiration was performed to ensure intra-articular placement and the medication was injected. Upon withdrawal of the needle the site was cleaned with alcohol and a band aid applied. The patient tolerated the injection well and there were no adverse effects. Post injection instructions included no strenuous activity for 24 hours, cryotherapy and if there are any adverse effects to contact the office. \par \par

## 2021-02-12 NOTE — HISTORY OF PRESENT ILLNESS
[Pain Location] : pain [Worsening] : worsening [___ yrs] : [unfilled] year(s) ago [5] : a current pain level of 5/10 [7] : a maximum pain level of 7/10 [Walking] : worsened by walking [Knee Flexion] : worsened with knee flexion [Rest] : relieved by rest [de-identified] : 80 year old male here for follow-up evaluation of right knee pain. He has known bone-on-bone right knee osteoarthritis. He had Euflexxa injections, synvisc and cortisone injections in the past. patient states good relief with synvisc. cortisone helps but last short. Pain is intermittent and localized to the medial aspect of the knee. Pain is worse with weightbearing and ambulating. He had Synvisc 4-5 years ago. Patient states took Mobic with relief however Nephro advised can not take due to kidney failure. patient had prostate CA that did met. to pelvis. pt is on Lupron injection. for having CA and his age he is not interested in TKA. He does exercise ~3 days per week in the gym. \par  \par \par He states the symptoms are worsening. \par His symptoms occur while walking. He states the symptoms seem to be intermittent. \par \par Modifying factors - worsened by walking. Relieving factors include relieved by recumbency and relieved by rest . cortisone injection. \par \par

## 2021-02-12 NOTE — DISCUSSION/SUMMARY
[de-identified] : 80 year old male with bone-on-bone medial compartment osteoarthritis of the right knee. Based on imaging he is a candidate for right TKA, but will continue with conservative treatment for the time being. He got a steroid injection toady and we discussed TKA. I ordere synvisc injection for right knee today. He will follow-up when the H.A injection is available. \par \par The patient is a 80 year old individual with end stage arthritis of their right knee joint. Based upon the patient's continued symptoms and failure to respond to conservative treatment I have recommended a right total knee arthroplasty for this patient. A long discussion took place with the patient describing what a total joint replacement is and what the procedure would entail. A total knee arthroplasty model, similar to the implant that will be used during the operation, was utilized to demonstrate and to discuss the various bearing surfaces of the implants. The hospitalization and post-operative care and rehabilitation were also discussed. The use of perioperative antibiotics and DVT prophylaxis were discussed. The risk, benefits and alternatives to a surgical intervention were discussed at length with the patient. The patient was also advised of risks related to the medical comorbidities and elevated body mass index (BMI). A lengthy discussion took place to review the most common complications including but not limited to: deep vein thrombosis, pulmonary embolus, heart attack, stroke, infection, wound breakdown, numbness, damage to nerves, tendon, muscles, arteries or other blood vessels, death and other possible complications from anesthesia. The patient was told that we will take steps to minimize these risks by using sterile technique, antibiotics and DVT prophylaxis when appropriate and follow the patient postoperatively in the office setting to monitor progress. The possibility of recurrent pain, no improvement in pain and actual worsening of pain were also discussed with the patient.\par The discharge plan of care focused on the patient going home following surgery. The patient was encouraged to make the necessary arrangements to have someone stay with them when they are discharged home. Following discharge, a home care nurse will visit the patient. The home care nurse will open your home care case and request home physical therapy services. Home physical therapy will commence following discharge provided it is appropriate and covered by the health insurance benefit plan. \par The benefits of surgery were discussed with the patient including the potential for improving his/her current clinical condition through operative intervention. Alternatives to surgical intervention including continued conservative management were also discussed in detail. All questions were answered to the satisfaction of the patient. The treatment plan of care, as well as a model of a total knee arthroplasty equivalent to the one that will be used for their total joint replacement, was shared with the patient. The patient agreed to the plan of care as well as the use of implants in their total joint replacement. \par

## 2021-02-12 NOTE — PHYSICAL EXAM
[de-identified] : GENERAL APPEARANCE: Well nourished and hydrated, pleasant, alert, and oriented x 3. Appears their stated age. \par HEENT: Normocephalic, extraocular eye motion intact. Nasal septum midline. Oral cavity clear. External auditory canal clear. \par RESPIRATORY: Breath sounds clear and audible in all lobes. No wheezing, No accessory muscle use.\par CARDIOVASCULAR: No apparent abnormalities. No lower leg edema. No varicosities. Pedal pulses are palpable.\par NEUROLOGIC: Sensation is normal, no muscle weakness in the upper or lower extremities.\par DERMATOLOGIC: No apparent skin lesions, moist, warm, no rash.\par SPINE: Cervical spine appears normal and moves freely; thoracic spine appears normal and moves freely; lumbosacral spine appears normal and moves freely, normal, nontender.\par MUSCULOSKELETAL: Hands, wrists, and elbows are normal and move freely, shoulders are normal and move freely. \par Musculoskeletal: Gait: normal and not antalgic . Right knee exam shows medial joint line tenderness, pain and crepitus with ROM, varus deformity present, preserved ROM. \par  \par 5/5 motor strength in bilateral lower extremities. Sensory: Intact in bilateral lower extremities. DTRs: Biceps, brachioradialis, triceps, patellar, ankle and plantar 2+ and symmetric bilaterally. Pulses: dorsalis pedis, posterior tibial, femoral, popliteal, and radial 2+ and symmetric bilaterally. \par

## 2021-02-19 RX ORDER — HYALURONATE SODIUM 30 MG/2 ML
30 SYRINGE (ML) INTRAARTICULAR
Qty: 3 | Refills: 0 | Status: ACTIVE | OUTPATIENT
Start: 2021-02-19

## 2021-05-21 ENCOUNTER — APPOINTMENT (OUTPATIENT)
Dept: ORTHOPEDIC SURGERY | Facility: CLINIC | Age: 81
End: 2021-05-21
Payer: MEDICARE

## 2021-05-21 VITALS
HEART RATE: 77 BPM | BODY MASS INDEX: 33.79 KG/M2 | WEIGHT: 236 LBS | HEIGHT: 70 IN | DIASTOLIC BLOOD PRESSURE: 70 MMHG | SYSTOLIC BLOOD PRESSURE: 158 MMHG

## 2021-05-21 PROCEDURE — 20610 DRAIN/INJ JOINT/BURSA W/O US: CPT | Mod: RT

## 2021-05-21 NOTE — PROCEDURE
[de-identified] : pt received orthovisc injection\par \par Knee injection viscosupplementation: I discussed at length with the patient the planned steroid and lidocaine injection for primary osteoarthritis. The risks, benefits, convalescence and alternatives were reviewed and pt consented for injection. The possible side effects discussed included but were not limited to: pain, swelling, heat and redness. There symptoms are generally mild but if they are extensive then contact the office. Giving pain relievers by mouth such as NSAID's or Tylenol can generally treat the reactions to injection. Rare cases of infection have been noted. Rash, hives and itching may occur post injection. If you have muscle pain or cramps, flushing and or swelling of the face, rapid heart beat, nausea, dizziness, fever, chills, headache, difficulty breathing, swelling in the arms or legs, or have a prickly feeling of your skin, contact a health care provider immediately. Following this discussion, the knee was prepped with alcohol and under sterile condition the injection was performed through a superolateral injection site with a 20 gauge needle. The needle was introduced into the joint, aspiration was performed to ensure intra-articular placement and the medication was injected. Upon withdrawal of the needle the site was cleaned with alcohol and a band aid applied. The patient tolerated the injection well and there were no adverse effects. Post injection instructions included no strenuous activity for 24 hours, cryotherapy and if there are any adverse effects to contact the office. \par

## 2021-05-21 NOTE — HISTORY OF PRESENT ILLNESS
[Pain Location] : pain [4] : a current pain level of 4/10 [de-identified] : 80 year old male here for follow-up evaluation of right knee pain. He has known bone-on-bone right knee osteoarthritis. He had Euflexxa injections, synvisc and cortisone injections in the past. patient states good relief with synvisc. cortisone helps but last short. Pain is intermittent and localized to the medial aspect of the knee. Pain is worse with weightbearing and ambulating. He had Synvisc 4-5 years ago. Patient states took Mobic with relief however Nephro advised can not take due to kidney failure. patient had prostate CA that did met. to pelvis. pt is on Lupron injection. for having CA and his age he is not interested in TKA. He does exercise ~3 days per week in the gym. \par \par  \par \par He states the symptoms are worsening. \par His symptoms occur while walking. He states the symptoms seem to be intermittent. \par \par Modifying factors - worsened by walking. Relieving factors include relieved by recumbency and relieved by rest . cortisone injection. \par \par

## 2021-05-21 NOTE — PHYSICAL EXAM
[de-identified] : GENERAL APPEARANCE: Well nourished and hydrated, pleasant, alert, and oriented x 3. Appears their stated age. \par HEENT: Normocephalic, extraocular eye motion intact. Nasal septum midline. Oral cavity clear. External auditory canal clear. \par RESPIRATORY: Breath sounds clear and audible in all lobes. No wheezing, No accessory muscle use.\par CARDIOVASCULAR: No apparent abnormalities. No lower leg edema. No varicosities. Pedal pulses are palpable.\par NEUROLOGIC: Sensation is normal, no muscle weakness in the upper or lower extremities.\par DERMATOLOGIC: No apparent skin lesions, moist, warm, no rash.\par SPINE: Cervical spine appears normal and moves freely; thoracic spine appears normal and moves freely; lumbosacral spine appears normal and moves freely, normal, nontender.\par MUSCULOSKELETAL: Hands, wrists, and elbows are normal and move freely, shoulders are normal and move freely. \par Musculoskeletal: Gait: normal and not antalgic . Right knee exam shows medial joint line tenderness, pain and crepitus with ROM, varus deformity present, preserved ROM. \par  \par 5/5 motor strength in bilateral lower extremities. Sensory: Intact in bilateral lower extremities. DTRs: Biceps, brachioradialis, triceps, patellar, ankle and plantar 2+ and symmetric bilaterally. Pulses: dorsalis pedis, posterior tibial, femoral, popliteal, and radial 2+ and symmetric bilaterally. \par

## 2021-05-21 NOTE — REASON FOR VISIT
[Follow-Up Visit] : a follow-up visit for [FreeTextEntry2] : right knee pain. Orthovisc injection #1. LOT#: [id number]. EXP: 11/30/2022.

## 2021-05-21 NOTE — DISCUSSION/SUMMARY
[de-identified] : 80 year old male with bone-on-bone medial compartment osteoarthritis of the right knee. Based on imaging he is a candidate for right TKA, but will continue with conservative treatment for the time being.  the steroid porvided short term releif. he opted in for orthovisc injection for right knee today. pt tolerated well. f/u in1 week for 2/3 orthovisc\par \par The patient is a 80 year old individual with end stage arthritis of their right knee joint. Based upon the patient's continued symptoms and failure to respond to conservative treatment I have recommended a right total knee arthroplasty for this patient. A long discussion took place with the patient describing what a total joint replacement is and what the procedure would entail. A total knee arthroplasty model, similar to the implant that will be used during the operation, was utilized to demonstrate and to discuss the various bearing surfaces of the implants. The hospitalization and post-operative care and rehabilitation were also discussed. The use of perioperative antibiotics and DVT prophylaxis were discussed. The risk, benefits and alternatives to a surgical intervention were discussed at length with the patient. The patient was also advised of risks related to the medical comorbidities and elevated body mass index (BMI). A lengthy discussion took place to review the most common complications including but not limited to: deep vein thrombosis, pulmonary embolus, heart attack, stroke, infection, wound breakdown, numbness, damage to nerves, tendon, muscles, arteries or other blood vessels, death and other possible complications from anesthesia. The patient was told that we will take steps to minimize these risks by using sterile technique, antibiotics and DVT prophylaxis when appropriate and follow the patient postoperatively in the office setting to monitor progress. The possibility of recurrent pain, no improvement in pain and actual worsening of pain were also discussed with the patient.\par The discharge plan of care focused on the patient going home following surgery. The patient was encouraged to make the necessary arrangements to have someone stay with them when they are discharged home. Following discharge, a home care nurse will visit the patient. The home care nurse will open your home care case and request home physical therapy services. Home physical therapy will commence following discharge provided it is appropriate and covered by the health insurance benefit plan. \par The benefits of surgery were discussed with the patient including the potential for improving his/her current clinical condition through operative intervention. Alternatives to surgical intervention including continued conservative management were also discussed in detail. All questions were answered to the satisfaction of the patient. The treatment plan of care, as well as a model of a total knee arthroplasty equivalent to the one that will be used for their total joint replacement, was shared with the patient. The patient agreed to the plan of care as well as the use of implants in their total joint replacement. \par

## 2021-05-28 ENCOUNTER — NON-APPOINTMENT (OUTPATIENT)
Age: 81
End: 2021-05-28

## 2021-05-28 ENCOUNTER — APPOINTMENT (OUTPATIENT)
Dept: ORTHOPEDIC SURGERY | Facility: CLINIC | Age: 81
End: 2021-05-28
Payer: MEDICARE

## 2021-05-28 VITALS
WEIGHT: 236 LBS | DIASTOLIC BLOOD PRESSURE: 69 MMHG | HEIGHT: 70 IN | SYSTOLIC BLOOD PRESSURE: 163 MMHG | BODY MASS INDEX: 33.79 KG/M2 | HEART RATE: 81 BPM

## 2021-05-28 PROCEDURE — 20610 DRAIN/INJ JOINT/BURSA W/O US: CPT

## 2021-05-28 NOTE — END OF VISIT
[FreeTextEntry3] : I, Willie Sotelo, acted solely as a scribe for Dr. Elliot Gilbert on this date 05/28/2021.

## 2021-05-28 NOTE — REASON FOR VISIT
[Follow-Up Visit] : a follow-up visit for [FreeTextEntry2] : right knee pain. Orthovisc injection #2. LOT#: [id number]. EXP: 11/30/2022.

## 2021-05-28 NOTE — DISCUSSION/SUMMARY
[de-identified] : 80 year old male with bone-on-bone medial compartment osteoarthritis of the right knee. Based on imaging, he is a candidate for a right TKA but will continue with conservative treatment for the time being. The steroid injection provided short term relief. He opted in for 2/3 orthovisc injection for right knee today which he tolerated well. F/u in 1 week for 3/3 orthovisc\par he c/o left knee pain and we will evaluation at next visit.\par \par The patient is a 80 year old individual with end stage arthritis of their right knee joint. Based upon the patient's continued symptoms and failure to respond to conservative treatment I have recommended a right total knee arthroplasty for this patient. A long discussion took place with the patient describing what a total joint replacement is and what the procedure would entail. A total knee arthroplasty model, similar to the implant that will be used during the operation, was utilized to demonstrate and to discuss the various bearing surfaces of the implants. The hospitalization and post-operative care and rehabilitation were also discussed. The use of perioperative antibiotics and DVT prophylaxis were discussed. The risk, benefits and alternatives to a surgical intervention were discussed at length with the patient. The patient was also advised of risks related to the medical comorbidities and elevated body mass index (BMI). A lengthy discussion took place to review the most common complications including but not limited to: deep vein thrombosis, pulmonary embolus, heart attack, stroke, infection, wound breakdown, numbness, damage to nerves, tendon, muscles, arteries or other blood vessels, death and other possible complications from anesthesia. The patient was told that we will take steps to minimize these risks by using sterile technique, antibiotics and DVT prophylaxis when appropriate and follow the patient postoperatively in the office setting to monitor progress. The possibility of recurrent pain, no improvement in pain and actual worsening of pain were also discussed with the patient.\par The discharge plan of care focused on the patient going home following surgery. The patient was encouraged to make the necessary arrangements to have someone stay with them when they are discharged home. Following discharge, a home care nurse will visit the patient. The home care nurse will open your home care case and request home physical therapy services. Home physical therapy will commence following discharge provided it is appropriate and covered by the health insurance benefit plan. \par The benefits of surgery were discussed with the patient including the potential for improving his/her current clinical condition through operative intervention. Alternatives to surgical intervention including continued conservative management were also discussed in detail. All questions were answered to the satisfaction of the patient. The treatment plan of care, as well as a model of a total knee arthroplasty equivalent to the one that will be used for their total joint replacement, was shared with the patient. The patient agreed to the plan of care as well as the use of implants in their total joint replacement. \par

## 2021-05-28 NOTE — PROCEDURE
[de-identified] : pt received orthovisc injection\par \par Knee injection viscosupplementation: I discussed at length with the patient the planned steroid and lidocaine injection for primary osteoarthritis. The risks, benefits, convalescence and alternatives were reviewed and pt consented for injection. The possible side effects discussed included but were not limited to: pain, swelling, heat and redness. There symptoms are generally mild but if they are extensive then contact the office. Giving pain relievers by mouth such as NSAID's or Tylenol can generally treat the reactions to injection. Rare cases of infection have been noted. Rash, hives and itching may occur post injection. If you have muscle pain or cramps, flushing and or swelling of the face, rapid heart beat, nausea, dizziness, fever, chills, headache, difficulty breathing, swelling in the arms or legs, or have a prickly feeling of your skin, contact a health care provider immediately. Following this discussion, the knee was prepped with alcohol and under sterile condition the injection was performed through a superolateral injection site with a 20 gauge needle. The needle was introduced into the joint, aspiration was performed to ensure intra-articular placement and the medication was injected. Upon withdrawal of the needle the site was cleaned with alcohol and a band aid applied. The patient tolerated the injection well and there were no adverse effects. Post injection instructions included no strenuous activity for 24 hours, cryotherapy and if there are any adverse effects to contact the office. \par

## 2021-06-04 ENCOUNTER — APPOINTMENT (OUTPATIENT)
Dept: ORTHOPEDIC SURGERY | Facility: CLINIC | Age: 81
End: 2021-06-04
Payer: MEDICARE

## 2021-06-04 VITALS
BODY MASS INDEX: 33.79 KG/M2 | HEIGHT: 70 IN | DIASTOLIC BLOOD PRESSURE: 68 MMHG | WEIGHT: 236 LBS | SYSTOLIC BLOOD PRESSURE: 155 MMHG | HEART RATE: 82 BPM

## 2021-06-04 DIAGNOSIS — M17.11 UNILATERAL PRIMARY OSTEOARTHRITIS, RIGHT KNEE: ICD-10-CM

## 2021-06-04 DIAGNOSIS — M17.12 UNILATERAL PRIMARY OSTEOARTHRITIS, LEFT KNEE: ICD-10-CM

## 2021-06-04 PROCEDURE — 20610 DRAIN/INJ JOINT/BURSA W/O US: CPT

## 2021-06-04 NOTE — END OF VISIT
[FreeTextEntry3] : I, Willie Sotelo, acted solely as a scribe for Dr. Elliot Gilbert on this date 06/04/2021.

## 2021-06-04 NOTE — REASON FOR VISIT
[Follow-Up Visit] : a follow-up visit for [Other: ____] : [unfilled] [FreeTextEntry2] : Orthovisc injection #3. LOT#: [id number]. EXP: 11/30/2022. \par

## 2021-06-04 NOTE — PROCEDURE
[de-identified] : pt received orthovisc injection\par \par Knee injection viscosupplementation: I discussed at length with the patient the planned steroid and lidocaine injection for primary osteoarthritis. The risks, benefits, convalescence and alternatives were reviewed and pt consented for injection. The possible side effects discussed included but were not limited to: pain, swelling, heat and redness. There symptoms are generally mild but if they are extensive then contact the office. Giving pain relievers by mouth such as NSAID's or Tylenol can generally treat the reactions to injection. Rare cases of infection have been noted. Rash, hives and itching may occur post injection. If you have muscle pain or cramps, flushing and or swelling of the face, rapid heart beat, nausea, dizziness, fever, chills, headache, difficulty breathing, swelling in the arms or legs, or have a prickly feeling of your skin, contact a health care provider immediately. Following this discussion, the knee was prepped with alcohol and under sterile condition the injection was performed through a superolateral injection site with a 20 gauge needle. The needle was introduced into the joint, aspiration was performed to ensure intra-articular placement and the medication was injected. Upon withdrawal of the needle the site was cleaned with alcohol and a band aid applied. The patient tolerated the injection well and there were no adverse effects. Post injection instructions included no strenuous activity for 24 hours, cryotherapy and if there are any adverse effects to contact the office. \par

## 2021-08-19 NOTE — DISCUSSION/SUMMARY
[Medication Risks Reviewed] : Medication risks reviewed [Surgical risks reviewed] : Surgical risks reviewed [de-identified] : 80 year old male with bone-on-bone medial compartment osteoarthritis of the right knee. Based on imaging, he is a candidate for a right TKA, but will continue with conservative treatment for the time being. The steroid injection provided short term relief. He opted in for 3/3 orthovisc injection for the right knee today which he tolerated well. He may repeat orthovisc injections in November 2021 if needed. \par \par The patient is a 80 year old individual with end stage arthritis of their right knee joint. Based upon the patient's continued symptoms and failure to respond to conservative treatment I have recommended a right total knee arthroplasty for this patient. A long discussion took place with the patient describing what a total joint replacement is and what the procedure would entail. A total knee arthroplasty model, similar to the implant that will be used during the operation, was utilized to demonstrate and to discuss the various bearing surfaces of the implants. The hospitalization and post-operative care and rehabilitation were also discussed. The use of perioperative antibiotics and DVT prophylaxis were discussed. The risk, benefits and alternatives to a surgical intervention were discussed at length with the patient. The patient was also advised of risks related to the medical comorbidities and elevated body mass index (BMI). A lengthy discussion took place to review the most common complications including but not limited to: deep vein thrombosis, pulmonary embolus, heart attack, stroke, infection, wound breakdown, numbness, damage to nerves, tendon, muscles, arteries or other blood vessels, death and other possible complications from anesthesia. The patient was told that we will take steps to minimize these risks by using sterile technique, antibiotics and DVT prophylaxis when appropriate and follow the patient postoperatively in the office setting to monitor progress. The possibility of recurrent pain, no improvement in pain and actual worsening of pain were also discussed with the patient.\par The discharge plan of care focused on the patient going home following surgery. The patient was encouraged to make the necessary arrangements to have someone stay with them when they are discharged home. Following discharge, a home care nurse will visit the patient. The home care nurse will open your home care case and request home physical therapy services. Home physical therapy will commence following discharge provided it is appropriate and covered by the health insurance benefit plan. \par The benefits of surgery were discussed with the patient including the potential for improving his/her current clinical condition through operative intervention. Alternatives to surgical intervention including continued conservative management were also discussed in detail. All questions were answered to the satisfaction of the patient. The treatment plan of care, as well as a model of a total knee arthroplasty equivalent to the one that will be used for their total joint replacement, was shared with the patient. The patient agreed to the plan of care as well as the use of implants in their total joint replacement. \par  3

## 2022-01-24 ENCOUNTER — EMERGENCY (EMERGENCY)
Facility: HOSPITAL | Age: 82
LOS: 1 days | Discharge: HOME | End: 2022-01-24
Attending: EMERGENCY MEDICINE
Payer: MEDICARE

## 2022-01-24 VITALS
RESPIRATION RATE: 18 BRPM | DIASTOLIC BLOOD PRESSURE: 72 MMHG | SYSTOLIC BLOOD PRESSURE: 185 MMHG | TEMPERATURE: 99 F | HEART RATE: 75 BPM | HEIGHT: 70 IN | WEIGHT: 212.97 LBS | OXYGEN SATURATION: 97 %

## 2022-01-24 PROCEDURE — 73610 X-RAY EXAM OF ANKLE: CPT

## 2022-01-24 PROCEDURE — 73610 X-RAY EXAM OF ANKLE: CPT | Mod: 26,LT

## 2022-01-24 PROCEDURE — 96374 THER/PROPH/DIAG INJ IV PUSH: CPT | Mod: XU

## 2022-01-24 PROCEDURE — 73600 X-RAY EXAM OF ANKLE: CPT | Mod: 26,LT,59

## 2022-01-24 PROCEDURE — 73630 X-RAY EXAM OF FOOT: CPT

## 2022-01-24 PROCEDURE — 73630 X-RAY EXAM OF FOOT: CPT | Mod: 26,LT

## 2022-01-24 PROCEDURE — 73600 X-RAY EXAM OF ANKLE: CPT

## 2022-01-24 PROCEDURE — 73590 X-RAY EXAM OF LOWER LEG: CPT

## 2022-01-24 PROCEDURE — 99285 EMERGENCY DEPT VISIT HI MDM: CPT | Mod: 25

## 2022-01-24 PROCEDURE — 99284 EMERGENCY DEPT VISIT MOD MDM: CPT

## 2022-01-24 PROCEDURE — 73590 X-RAY EXAM OF LOWER LEG: CPT | Mod: 26,LT

## 2022-01-24 PROCEDURE — 27810 TREATMENT OF ANKLE FRACTURE: CPT | Mod: LT

## 2022-01-24 RX ORDER — KETOROLAC TROMETHAMINE 30 MG/ML
15 SYRINGE (ML) INJECTION ONCE
Refills: 0 | Status: COMPLETED | OUTPATIENT
Start: 2022-01-24 | End: 2022-01-24

## 2022-01-24 RX ORDER — IBUPROFEN 200 MG
1 TABLET ORAL
Qty: 20 | Refills: 0
Start: 2022-01-24 | End: 2022-01-28

## 2022-01-24 RX ORDER — HYDROMORPHONE HYDROCHLORIDE 2 MG/ML
1 INJECTION INTRAMUSCULAR; INTRAVENOUS; SUBCUTANEOUS ONCE
Refills: 0 | Status: DISCONTINUED | OUTPATIENT
Start: 2022-01-24 | End: 2022-01-24

## 2022-01-24 RX ADMIN — HYDROMORPHONE HYDROCHLORIDE 1 MILLIGRAM(S): 2 INJECTION INTRAMUSCULAR; INTRAVENOUS; SUBCUTANEOUS at 11:37

## 2022-01-24 NOTE — PROVIDER CONTACT NOTE (OTHER) - ASSESSMENT
PT orders received, chart reviewed & contents noted. PT eval completed & documented, please see for details. pt received semi haile in ED stretcher, A&OX4, NAD & willing to participate w/ PT. pt reports 1/10 left ankle pain pre/post PT. pt amb & performed transfers using knee scooter. As per Ortho PA Helena marino'ed pt to weight bear through knee. No adverse events. pt left sitting EOB w/ all lines intact, CBWR & NAD. pt instructed to not get up w/o assistance, reports understanding. RN Daya rangel. Case discussed with PT Hugo.

## 2022-01-24 NOTE — ED PROVIDER NOTE - NS ED ROS FT
ROS: CONTUSIONAL: Denies fever, chills, fatigue, wt loss. HEAD: Denies trauma, HA, Dizziness. EYE: Denies Acute visual changes, diplopia. ENMT: Denies change in hearing, tinnitus, epistaxis, difficulty swallowing, sore throat. CARDIO: Denies CP, palpitations, edema. RESP: Denies Cough, SOB , Diff breathing, hemoptysis. GI: Denies N/V, ABD pain, change in bowel movement. URINARY: Denies difficulty urinating, pelvic pain. MS:  joint pain, Denies back pain, weakness, decreased ROM, swelling. NEURO: Denies change in gait, seizures, loss of sensation, dizziness, confusion LOC.  PSY: NO SI/HI. SKIN: Denies Rash, bruising.

## 2022-01-24 NOTE — PROVIDER CONTACT NOTE (OTHER) - RECOMMENDATIONS
D/C recommendations: home with assist as needed, use of knee scooter and outpatient PT when cleared by MD.

## 2022-01-24 NOTE — ED PROVIDER NOTE - CARE PROVIDER_API CALL
Mahin Montoya (DO)  Orthopedics  [address] [place] [id number]  Phone: [phone]  Fax: [phone]  Follow Up Time:

## 2022-01-24 NOTE — CONSULT NOTE ADULT - SUBJECTIVE AND OBJECTIVE BOX
Pt Name: TELLY GRIFFIN    MRN: [id number]      Patient is a 81y Male presenting to the emergency department s/p [facility] fall on ice today. Pt had sudden onset of pain and swelling, unable to bear weight on LLE. Pt denies head trauma or LOC. Denies numbness or tingling. No other orthopedic complaints.     PAST MEDICAL & SURGICAL HISTORY:  PAST MEDICAL & SURGICAL HISTORY:    Allergies: No Known Allergies    Medications: ketorolac   Injectable 15 milliGRAM(s) IV Push Once    FAMILY HISTORY:  : non-contributory    Social History:     Vital Signs Last 24 Hrs  T(C): 37 (24 Jan 2022 10:12), Max: 37 (24 Jan 2022 10:12)  T(F): 98.6 (24 Jan 2022 10:12), Max: 98.6 (24 Jan 2022 10:12)  HR: 75 (24 Jan 2022 10:12) (75 - 75)  BP: 185/72 (24 Jan 2022 10:12) (185/72 - 185/72)  BP(mean): --  RR: 18 (24 Jan 2022 10:12) (18 - 18)  SpO2: 97% (24 Jan 2022 10:12) (97% - 97%)    Daily Height in cm: 177.8 (24 Jan 2022 10:12)    Daily       PHYSICAL EXAM:      Appearance: Alert, responsive, in no acute distress.  LLE: +deformity noted to ankle, +swelling to lateral and medial ankle, skin intact throughout, +ROM toes intact, ankle ROM limited secondary to fracture, no knee pain, no pain over fibular head, DP2+, SILT, BCR, no cyanosis    FRACTURE REDUCTION  PROCEDURE NOTE: Fracture reduction     Performed by:  Tariq Proctor PA-C    Indication: Acute fracture with mild displacement, requiring fracture reduction.    Consent: The risks and benefits of the procedure including incomplete reduction, nerve damage and bleeding were explained and the patient verbalized their understanding and wished to proceed with the procedure. Verbal consent was obtained following the discussion.    Universal Protocol: a time out was performed and the correct patient and site were verified     Procedure: Neurovascular exam intact prior to fracture reduction.  Skin exam : No bleeding or lacerations at the fracture. Reduction of the left ankle was accomplished via axial traction and careful manipulation. Following adequate reduction and alignment of the fractured bone, the fracture was immobilized with a plaster splint. Distally, the extremity was neurovascular intact following the procedure.  The patient tolerated the procedure well.    Post reduction films obtained and demonstrated an adequate reduction.    Complications: None    Imaging Studies:    A/P:  Pt is a  81y Male with  found to have left bimalleolar fracture    PLAN:   * NWB LLE  * Pain Control  * cont. care per ED  * ortho stable for d/c - f/u 1 week w Dr Montoya

## 2022-01-24 NOTE — ED PROVIDER NOTE - CLINICAL SUMMARY MEDICAL DECISION MAKING FREE TEXT BOX
PT with stable VS, no acute distress, non toxic appearing, tolerating PO in the ED, Pt neuro vasc itnact, no acute skin changes, seen by ortho splinted by ortho, follow up out pt, pt seen by PT has assisted devise at home good family support, pt cleared for dc home with out pt follow up, educated about when to return to the ED if needed. PT verbalizes that he understands all instructions and results. Pt informed that ED is open and available 24/7 365 days a yr, encouraged to return to the ED if they have any change in condition, or feel the need for revaluation.

## 2022-01-24 NOTE — ED PROVIDER NOTE - PATIENT PORTAL LINK FT
You can access the FollowMyHealth Patient Portal offered by [facility] by registering at the following website: [email or web address]. By joining [facility]’s FollowMyHealth portal, you will also be able to view your health information using other applications (apps) compatible with our system.

## 2022-01-24 NOTE — PHYSICAL THERAPY INITIAL EVALUATION ADULT - ACTIVE RANGE OF MOTION EXAMINATION, REHAB EVAL
unable to formally assess left ankle secondary to fracture and injury./bilateral upper extremity Active ROM was WFL (within functional limits)/bilateral  lower extremity Active ROM was WFL (within functional limits)

## 2022-01-24 NOTE — PHYSICAL THERAPY INITIAL EVALUATION ADULT - LEVEL OF INDEPENDENCE: STAND/SIT, REHAB EVAL
verbal cues and assistance provided to safely dismount knee scooter./minimum assist (75% patients effort)

## 2022-01-24 NOTE — ED PROVIDER NOTE - NSFOLLOWUPINSTRUCTIONS_ED_ALL_ED_FT
Ankle Fracture    WHAT YOU NEED TO KNOW:    An ankle fracture is a break in 1 or more of the bones in your ankle.    Foot Anatomy         DISCHARGE INSTRUCTIONS:    Call your local emergency number (911 in the [place]) for any of the following:   •You feel lightheaded, short of breath, and have chest pain.      •You cough up blood.      Return to the emergency department if:   •Your leg feels warm, tender, and painful. It may look swollen and red.      •Blood soaks through your bandage.      •You have severe pain in your ankle.      •Your cast feels too tight.      •Your foot or toes are cold or numb.      •Your foot or toenails turn blue or gray.      Call your doctor if:   •Your splint feels too tight.      •Your swelling has increased or returned.      •You have a fever.      •Your pain does not go away, even after treatment.      •You have questions or concerns about your condition or care.      Medicines: You may need any of the following:   •Acetaminophen decreases pain and fever. It is available without a doctor's order. Ask how much to take and how often to take it. Follow directions. Read the labels of all other medicines you are using to see if they also contain acetaminophen, or ask your doctor or pharmacist. Acetaminophen can cause liver damage if not taken correctly. Do not use more than 4 grams (4,000 milligrams) total of acetaminophen in one day.       •NSAIDs, such as ibuprofen, help decrease swelling, pain, and fever. This medicine is available with or without a doctor's order. NSAIDs can cause stomach bleeding or kidney problems in certain people. If you take blood thinner medicine, always ask your healthcare provider if NSAIDs are safe for you. Always read the medicine label and follow directions.      •Prescription pain medicine may be given. Ask your healthcare provider how to take this medicine safely. Some prescription pain medicines contain acetaminophen. Do not take other medicines that contain acetaminophen without talking to your healthcare provider. Too much acetaminophen may cause liver damage. Prescription pain medicine may cause constipation. Ask your healthcare provider how to prevent or treat constipation.       •Take your medicine as directed. Contact your healthcare provider if you think your medicine is not helping or if you have side effects. Tell him or her if you are allergic to any medicine. Keep a list of the medicines, vitamins, and herbs you take. Include the amounts, and when and why you take them. Bring the list or the pill bottles to follow-up visits. Carry your medicine list with you in case of an emergency.      Self-care:     R.I.C.E.     •Rest your ankle so that it can heal. Return to normal activities as directed.      •Apply ice on your ankle for 15 to 20 minutes every hour or as directed. Use an ice pack, or put crushed ice in a plastic bag. Cover it with a towel. Ice helps prevent tissue damage and decreases swelling and pain.      •Use a support device, such as a brace, cast, or splint to limit your movement and protect your ankle. You may need to use crutches to protect your ankle and decrease your pain as you move around. Do not remove your device and do not put weight on your injured ankle.      •Elevate your ankle above the level of your heart as often as you can. This will help decrease swelling and pain. Prop your ankle on pillows or blankets to keep it elevated comfortably.      Splint and cast care: Cover the splint or cast before you bathe so it does not get wet. Tape 2 plastic trash bags to your skin above the cast. Try to keep your ankle out of the water as much as possible.    Follow up with your doctor in 1 to 2 days, or as directed: Your fracture may need to be reduced (bones pushed back into place) or you may need surgery. Write down your questions so you remember to ask them during your visits.

## 2022-01-24 NOTE — PHYSICAL THERAPY INITIAL EVALUATION ADULT - GENERAL OBSERVATIONS, REHAB EVAL
pt received semi haile in ED stretcher in hallway + IV lock, left LE in bandage, A&OX4, NAD & willing to participate with PT.

## 2022-01-24 NOTE — ED PROVIDER NOTE - ADDITIONAL NOTES AND INSTRUCTIONS:
PT was evaluated At [facility] ED and was found to have a condition that warranted time of to rest and heal from WORK/SCHOOL.   Hugo Velazquez PA-C

## 2022-01-24 NOTE — ED ADULT NURSE NOTE - OBJECTIVE STATEMENT
Pt is here with c/o pain and obv deformity to L ankle.  States she slipped on black ice this morning.  Denies dizziness or LOC.  c/o pain 8/10.

## 2022-01-24 NOTE — ED PROVIDER NOTE - CHIEF COMPLAINT
The patient is a 36y Male complaining of see chief complaint quote. The patient is a 81y Male complaining of ankle pain/injury.

## 2022-01-24 NOTE — PHYSICAL THERAPY INITIAL EVALUATION ADULT - PERTINENT HX OF CURRENT PROBLEM, REHAB EVAL
81y Male presenting to the emergency department s/p [facility] fall on ice today. Pt had sudden onset of pain and swelling, unable to bear weight on LLE. Pt denies head trauma or LOC. Denies numbness or tingling.

## 2022-01-24 NOTE — ED PROVIDER NOTE - OBJECTIVE STATEMENT
PT with no SPMHx presents to the ED with complaint of Lt ankle pain s/p slip and fall that occurred JPTA. Pt states that he slipped fell twisting his ankle. Pt states that he had a sudden onset of moderate sharp pain in his ankle that is non radiating, constat, made worse with activity improved by nothing. PT denies fever, chills, numbness tingling, loss of sensation saddle anesthesia, weakness, HE, loss of control of urine, or bowels IVDA, head strike, neck pain, back pain.

## 2022-01-24 NOTE — ED PROVIDER NOTE - ATTENDING CONTRIBUTION TO CARE
82 yo pmh of prostate ca s/p fall on driveway covered with ice; pt complains of left ankle pain; pt denies any head, neck or chest injury; pe awake alert in nad heent neck supple cor s1s2 lungs clear abd soft nontender ext left ankle moderate swelling; tender to palp medial malleolus;  dx ankle injury;

## 2022-01-24 NOTE — PROVIDER CONTACT NOTE (OTHER) - ACTION/TREATMENT ORDERED:
PT Goals( to achieve in 1 weeks);Pt independent with bed mobility. Pt mod. independent with transfers using knee scooters.  Pt mod I with amb with knee scooter X 300 feet

## 2022-01-24 NOTE — PHYSICAL THERAPY INITIAL EVALUATION ADULT - LEVEL OF INDEPENDENCE: STAIR NEGOTIATION, REHAB EVAL
pt deferred stair training, states he can enter through the back door with 0 steps into the kitchen. No steps inside the house.

## 2022-01-24 NOTE — PHYSICAL THERAPY INITIAL EVALUATION ADULT - ADDITIONAL COMMENTS
pt reports he lives in a single story house with 2 steps to enter from the front door and 0 steps from the backdoor. pt reports independence with all ADLs, no assistive device needed and driving prior to admission. pt owns a cane and knee scooter. Wife is home and able to assist as needed.

## 2022-01-24 NOTE — ED PROVIDER NOTE - PHYSICAL EXAMINATION
LLE: no gross deformity of extremity, FROM knee/ hip, no fibula head/ prox fibula tenderness, (-) tenderness to base 5th MT, (-) tenderness to posterior aspect of lateral malleolus, no medial malleolus tenderness, (+) tenderness to ATFL, negative drawer test.

## 2022-01-26 NOTE — CHART NOTE - NSCHARTNOTEFT_GEN_A_CORE
Post Discharge Note.  Writer received call from patient/spouse [phone] requesting help getting outpt f/u with podiatry.  Appt offered for Monday 1/31 however pt/spouse declined due to potential snow storm.    Outpatient Follow Up arranged with Dr. Mackay on 2/3/22 at 3:00pm  Podiatry  [address] [place] [address]  Phone: [phone]

## 2022-02-07 ENCOUNTER — APPOINTMENT (OUTPATIENT)
Dept: ORTHOPEDIC SURGERY | Facility: CLINIC | Age: 82
End: 2022-02-07
Payer: MEDICARE

## 2022-02-07 DIAGNOSIS — S82.892A OTHER FRACTURE OF LEFT LOWER LEG, INITIAL ENCOUNTER FOR CLOSED FRACTURE: ICD-10-CM

## 2022-02-07 PROCEDURE — 73610 X-RAY EXAM OF ANKLE: CPT | Mod: LT

## 2022-02-07 PROCEDURE — 99214 OFFICE O/P EST MOD 30 MIN: CPT

## 2022-02-07 RX ORDER — IBUPROFEN 600 MG/1
600 TABLET, FILM COATED ORAL
Qty: 15 | Refills: 0 | Status: ACTIVE | COMMUNITY
Start: 2022-02-07 | End: 1900-01-01

## 2022-02-07 NOTE — DISCUSSION/SUMMARY
[de-identified] : 81-year-old male status post fall with left bimalleolar ankle fracture.  We discussed that while the fracture would likely heal in its current position, it would likely lead him with ankle instability which can lead to pain, posttraumatic arthritis and loss of function.\par \par It was explained to the patient that any surgical procedure carries with it the risks of loss of limb or loss of life. Medical complications include but are not limited to death or disability from a heart attack, stroke, GI bleeding, thrombophlebitis and pulmonary embolism, sepsis, adverse reactions including death due to blood transfusions, allergy or adverse drug reaction. There are other rare, unknown and uncommon systemic conditions that could also adversely affect the systemic outcome. Local complications include but are not limited to wound dehiscence, deep infection, failure of fixation or reconstruction, damage to nerves and vessels which could be temporary or permanent, as well as other rare, uncommon and unknown local complications that may necessitate re-operation, more complex orthopaedic reconstructions or amputation.\par \par Despite the risks, he wishes to proceed.\par \par The patient was given the opportunity to ask questions and all questions were answered to their satisfaction.\par \par Elliot Pathak MD\par Orthopaedic Trauma Surgeon\par [facility]\par [facility]\par Director Orthopaedic Trauma, [facility]\par \par \par \par

## 2022-02-07 NOTE — PHYSICAL EXAM
[de-identified] : Physical Exam:\par General: Well appearing, no acute distress, A&O\par Neurologic: A&Ox3, No focal deficits\par Head: NCAT without abrasions, lacerations, or ecchymosis to head, face, or scalp\par Respiratory: Equal chest wall expansion bilaterally, no accessory muscle use\par Lymphatic: No lymphadenopathy palpated\par Skin: Warm and dry\par Psychiatric: Normal mood and affect\par \par LLE:\par Splint in place,\par Sensation intact to exposed toes\par Brisk capillary refill [de-identified] : Plain films of the left ankle obtained today show a bimalleolar ankle fracture with widened medial clear space.

## 2022-02-07 NOTE — HISTORY OF PRESENT ILLNESS
[de-identified] : Patient is a pleasant 81-year-old male presents today with complaints of left ankle pain.  About 2 weeks ago he suffered a fall.  He was seen in the ER.  X-rays were obtained and a bimalleolar ankle fracture was diagnosed.  The patient states that the orthopedic physician assistants performed a closed reduction placement of a splint.  He states he called the call center for an appointment but he was told to go follow-up with podiatry.  He saw the podiatrist who reportedly took down the splint and stated that he needed surgery.  He subsequently made an appointment with us today.  The patient states the pain is made worse with activity and relieved with rest. dull, 3/10 [Bending] : worsened by bending [Lifting] : worsened by lifting [Weight Bearing] : worsened by weight bearing [Recumbency] : relieved by recumbency [Rest] : relieved by rest

## 2022-02-10 ENCOUNTER — OUTPATIENT (OUTPATIENT)
Dept: OUTPATIENT SERVICES | Facility: HOSPITAL | Age: 82
LOS: 1 days | End: 2022-02-10
Payer: MEDICARE

## 2022-02-10 VITALS
TEMPERATURE: 98 F | SYSTOLIC BLOOD PRESSURE: 149 MMHG | DIASTOLIC BLOOD PRESSURE: 58 MMHG | OXYGEN SATURATION: 94 % | WEIGHT: 220.46 LBS | RESPIRATION RATE: 20 BRPM | HEART RATE: 86 BPM | HEIGHT: 70 IN

## 2022-02-10 DIAGNOSIS — Z90.89 ACQUIRED ABSENCE OF OTHER ORGANS: Chronic | ICD-10-CM

## 2022-02-10 DIAGNOSIS — I10 ESSENTIAL (PRIMARY) HYPERTENSION: ICD-10-CM

## 2022-02-10 DIAGNOSIS — S82.892A OTHER FRACTURE OF LEFT LOWER LEG, INITIAL ENCOUNTER FOR CLOSED FRACTURE: ICD-10-CM

## 2022-02-10 DIAGNOSIS — Z29.9 ENCOUNTER FOR PROPHYLACTIC MEASURES, UNSPECIFIED: ICD-10-CM

## 2022-02-10 DIAGNOSIS — Z98.890 OTHER SPECIFIED POSTPROCEDURAL STATES: Chronic | ICD-10-CM

## 2022-02-10 DIAGNOSIS — Z01.818 ENCOUNTER FOR OTHER PREPROCEDURAL EXAMINATION: ICD-10-CM

## 2022-02-10 LAB
A1C WITH ESTIMATED AVERAGE GLUCOSE RESULT: 5.5 % — SIGNIFICANT CHANGE UP (ref 4–5.6)
ANION GAP SERPL CALC-SCNC: 11 MMOL/L — SIGNIFICANT CHANGE UP (ref 5–17)
APTT BLD: 28.9 SEC — SIGNIFICANT CHANGE UP (ref 27.5–35.5)
BASOPHILS # BLD AUTO: 0.1 K/UL — SIGNIFICANT CHANGE UP (ref 0–0.2)
BASOPHILS NFR BLD AUTO: 1.4 % — SIGNIFICANT CHANGE UP (ref 0–2)
BLD GP AB SCN SERPL QL: SIGNIFICANT CHANGE UP
BUN SERPL-MCNC: 21.4 MG/DL — HIGH (ref 8–20)
CALCIUM SERPL-MCNC: 10.2 MG/DL — SIGNIFICANT CHANGE UP (ref 8.6–10.2)
CHLORIDE SERPL-SCNC: 106 MMOL/L — SIGNIFICANT CHANGE UP (ref 98–107)
CO2 SERPL-SCNC: 24 MMOL/L — SIGNIFICANT CHANGE UP (ref 22–29)
COMMENT - BLOOD BANK: SIGNIFICANT CHANGE UP
CREAT SERPL-MCNC: 1.17 MG/DL — SIGNIFICANT CHANGE UP (ref 0.5–1.3)
EOSINOPHIL # BLD AUTO: 0.18 K/UL — SIGNIFICANT CHANGE UP (ref 0–0.5)
EOSINOPHIL NFR BLD AUTO: 2.5 % — SIGNIFICANT CHANGE UP (ref 0–6)
ESTIMATED AVERAGE GLUCOSE: 111 MG/DL — SIGNIFICANT CHANGE UP (ref 68–114)
GLUCOSE SERPL-MCNC: 125 MG/DL — HIGH (ref 70–99)
HCT VFR BLD CALC: 39.7 % — SIGNIFICANT CHANGE UP (ref 39–50)
HGB BLD-MCNC: 13 G/DL — SIGNIFICANT CHANGE UP (ref 13–17)
IMM GRANULOCYTES NFR BLD AUTO: 1.6 % — HIGH (ref 0–1.5)
INR BLD: 1.06 RATIO — SIGNIFICANT CHANGE UP (ref 0.88–1.16)
LYMPHOCYTES # BLD AUTO: 1.44 K/UL — SIGNIFICANT CHANGE UP (ref 1–3.3)
LYMPHOCYTES # BLD AUTO: 20.4 % — SIGNIFICANT CHANGE UP (ref 13–44)
MCHC RBC-ENTMCNC: 28.9 PG — SIGNIFICANT CHANGE UP (ref 27–34)
MCHC RBC-ENTMCNC: 32.7 GM/DL — SIGNIFICANT CHANGE UP (ref 32–36)
MCV RBC AUTO: 88.2 FL — SIGNIFICANT CHANGE UP (ref 80–100)
MONOCYTES # BLD AUTO: 0.72 K/UL — SIGNIFICANT CHANGE UP (ref 0–0.9)
MONOCYTES NFR BLD AUTO: 10.2 % — SIGNIFICANT CHANGE UP (ref 2–14)
NEUTROPHILS # BLD AUTO: 4.52 K/UL — SIGNIFICANT CHANGE UP (ref 1.8–7.4)
NEUTROPHILS NFR BLD AUTO: 63.9 % — SIGNIFICANT CHANGE UP (ref 43–77)
PLATELET # BLD AUTO: 316 K/UL — SIGNIFICANT CHANGE UP (ref 150–400)
POTASSIUM SERPL-MCNC: 4.9 MMOL/L — SIGNIFICANT CHANGE UP (ref 3.5–5.3)
POTASSIUM SERPL-SCNC: 4.9 MMOL/L — SIGNIFICANT CHANGE UP (ref 3.5–5.3)
PROTHROM AB SERPL-ACNC: 12.3 SEC — SIGNIFICANT CHANGE UP (ref 10.6–13.6)
RBC # BLD: 4.5 M/UL — SIGNIFICANT CHANGE UP (ref 4.2–5.8)
RBC # FLD: 14 % — SIGNIFICANT CHANGE UP (ref 10.3–14.5)
SODIUM SERPL-SCNC: 141 MMOL/L — SIGNIFICANT CHANGE UP (ref 135–145)
WBC # BLD: 7.07 K/UL — SIGNIFICANT CHANGE UP (ref 3.8–10.5)
WBC # FLD AUTO: 7.07 K/UL — SIGNIFICANT CHANGE UP (ref 3.8–10.5)

## 2022-02-10 PROCEDURE — 93010 ELECTROCARDIOGRAM REPORT: CPT

## 2022-02-10 PROCEDURE — G0463: CPT

## 2022-02-10 PROCEDURE — 93005 ELECTROCARDIOGRAM TRACING: CPT

## 2022-02-10 RX ORDER — IBUPROFEN 200 MG
0 TABLET ORAL
Qty: 0 | Refills: 0 | DISCHARGE

## 2022-02-10 RX ORDER — OXYCODONE AND ACETAMINOPHEN 5; 325 MG/1; MG/1
0 TABLET ORAL
Qty: 0 | Refills: 0 | DISCHARGE

## 2022-02-10 RX ORDER — LOSARTAN/HYDROCHLOROTHIAZIDE 100MG-25MG
0 TABLET ORAL
Qty: 0 | Refills: 2 | DISCHARGE

## 2022-02-10 NOTE — H&P PST ADULT - PROBLEM SELECTOR PLAN 2
1 month well check    Subjective     Patient ID: Corrina is a 5 week old female who is accompanied by:mother     Concerns: mild papular rash on face     DIET (per day) :  - milk and type: EBF and usually 8 min each side; q 2-3 in daytime and q 3-4 at nighttime takes up to 3-4oz of pumped milk at times and hold her over longer at nnight    Vitamin D: yes    Elimination:   - Stool/urine diapers: daily bms and wets  - Problems with constipation: no    Sleep:  - hours of sleep per night: 3-4  - [facility]  - sleep issues: no    Development:  Cognitive:  - Has started to smile and make more expressions yes  Communicative:  - Recognizes parents voices yes  - Has different cries for different needs yes  Physical Development:  - Does tummy time yes  - Able to lift head when on tummy yes    ROS  Birth History   • Birth     Length: 19.75\" (50.2 cm)     Weight: 3.165 kg (6 lb 15.6 oz)   • Apgar     One: 9.0     Five: 9.0   • Delivery Method: Vaginal, Spontaneous   • Gestation Age: 39 6/7 wks   • Duration of Labor: 1st: 3h 27m / 2nd: 1h 59m       History Review:  Corrina has Jaundice of  on their problem list.  Corrina currently has no medications in their medication list.  Corrina has No Known Allergies.    Objective:  Vitals:    20 1047   Temp: 98.6 °F (37 °C)     39 %ile (Z= -0.28) based on WHO (Girls, 0-2 years) weight-for-age data using vitals from 2020.  56 %ile (Z= 0.16) based on WHO (Girls, 0-2 years) Length-for-age data based on Length recorded on 2020.  85 %ile (Z= 1.02) based on WHO (Girls, 0-2 years) head circumference-for-age based on Head Circumference recorded on 2020.  Physical Exam    Constitutional: Appears well-nourished, active and a strong cry.   Head: Anterior fontanelle is flat.   Eyes: Red reflex bilaterally  Right Ear: Tympanic membrane normal.   Left Ear: Tympanic membrane normal.   Nose: Nose normal.   Mouth/Throat: Mucous membranes are moist.   Cardiovascular: Normal  rate, regular rhythm, S1 normal and S2 normal.   No murmur heard.Capillary refill takes less than 2 seconds.   Pulmonary/Chest: Effort normal and breath sounds normal.   Abdominal: Soft. Bowel sounds are normal, non-tender and no distension.   Genitourinary: aliyah 1  Musculoskeletal: Normal range of motion.   Negative Ortolani and Polanco maneuvers of both hips   Neurological: Alert. Normal tone.   Skin: Skin is warm and moist. Mild rash      screen results: Normal yes    Assessment: Doing well in growth, developmental areas and routine screening within normal limits- papular facial rash could be more seb derm in areas and ok for aquaphor. Father had bad allergies and eczema as baby so watchful. Mom thinks milk supply may be decreasing, ok for supplemental formula as needed.  Problem List Items Addressed This Visit     None      Visit Diagnoses     Well child check,  8-28 days old    -  Primary        Plan:  New born care including 5S’s for comforting.No spoiling. Also ok for baby to cry.  Plan for vitamin D supplementation 400 IU daily if exclusively breastfeeding.  Encourage tummy time throughout day and looking both directions with neck movement.  Next visit at 2 months with vaccinations, Dtap-IPV-Hep B, Prevnar, HiB and Rota.  Lakeisha Kovacs MD       routine labs and ekg  medical clearance with [facility] [phone]

## 2022-02-10 NOTE — H&P PST ADULT - HISTORY OF PRESENT ILLNESS
81 year old male present today for pst after s/p fall on snow and fracture left ankle.   81 year old male present today for pst after s/p fall on snow and fracture left ankle.   He is schedule for open reduction internal fixation, left ankle with Dr. Pathak on 2/17/2022    left tib-fib. AP and lateral views. 4 images.  There is mild to moderate knee degeneration. Proximal bones intact.  Left ankle. There is a bimalleolar fracture. There is a slight laxity of   the tibial talar articulation.  Left foot. 2 views.  Moderate first MTP degeneration. No further fracture.  Follow-up left ankle. 3 views. Splint applied. Alignment maintained.  IMPRESSION: Left ankle bimalleolar fracture with follow-up splinting.  . Jan 24 2022  3:11PM

## 2022-02-10 NOTE — H&P PST ADULT - BLOOD AVOIDANCE/RESTRICTIONS, PROFILE
Potassium   Date Value Ref Range Status   12/27/2018 5.5 (H) 3.4 - 5.3 mmol/L Final   ]  Lab Results   Component Value Date    HGB 6.1 12/27/2018     Weight: 64 kg (141 lb 1.5 oz)  POST WT  DIALYSIS PROCEDURE NOTE  Hepatitis status of previous patient on machine log was checked and ok to use with this patient hepatitis status.  Patient dialyzed for 3 hrs on a 2 K bath with a  net fluid removal of 2L.  A BFR of 300ml/min was obtained via LAVG.  Patient was seen by Dr. Mcknight during treatment.  Total heparin received during treatment:: 0 units.  Heparin instilled in both ports post run.  Meds given: 1 unit PRBC Complications: None   Procedure and ESRD teaching not completed as patient is not english speaking .  See flowsheet in EPIC for further details and post assessment. Transducer pods intact and checked every 15 mins.  Machine water alarm in place and functioning.  HEPATITIS B SURFACE ANTIGEN Negative Date 5/1/17 HEPATITIS B SURFACE ANTIBODY Immune DATE 3/5/18  CHLORINE AND CHLORAMINES CHECK on WATER SYSTEM EVERY 4 hours.   PT returned via WC  Catheter Access: Aseptic prep done for both on/off.  Report received from: DEBI Mcnally RN  Report given to: DEBI Mcnally RN  Outpatient Dialysis at [facility]   none

## 2022-02-10 NOTE — H&P PST ADULT - ASSESSMENT
CAPRINI SCORE [CLOT]    AGE RELATED RISK FACTORS                                                       MOBILITY RELATED FACTORS  [ ] Age 41-60 years                                            (1 Point)                  [ ] Bed rest                                                        (1 Point)  [ ] Age: 61-74 years                                           (2 Points)                 [ ] Plaster cast                                                   (2 Points)  [x ] Age= 75 years                                              (3 Points)                   [ ] Bed bound for more than 72 hours                 (2 Points)    DISEASE RELATED RISK FACTORS                                               GENDER SPECIFIC FACTORS  [ ] Edema in the lower extremities                       (1 Point)              [ ] Pregnancy                                                     (1 Point)  [x ] Varicose veins                                               (1 Point)                     [ ] Post-partum < 6 weeks                                   (1 Point)             [x ] BMI > 25 Kg/m2                                            (1 Point)                     [ ] Hormonal therapy  or oral contraception          (1 Point)                 [ ] Sepsis (in the previous month)                        (1 Point)                [ ] History of pregnancy complications                 (1 point)  [ ] Pneumonia or serious lung disease                                                [ ] Unexplained or recurrent                     (1 Point)           (in the previous month)                               (1 Point)  [ ] Abnormal pulmonary function test                     (1 Point)                 SURGERY RELATED RISK FACTORS  [ ] Acute myocardial infarction                              (1 Point)                   [ ]  Section                                             (1 Point)  [ ] Congestive heart failure (in the previous month)  (1 Point)           [ ] Minor surgery                                                  (1 Point)   [ ] Inflammatory bowel disease                             (1 Point)                   [ ] Arthroscopic surgery                                        (2 Points)  [ ] Central venous access                                      (2 Points)                    [ x] General surgery lasting more than 45 minutes   (2 Points)       [ ] Stroke (in the previous month)                          (5 Points)                 [ ] Elective arthroplasty                                         (5 Points)            [ ] Malignacy (past or present)                          (2 ponits)                                                                                                                            HEMATOLOGY RELATED FACTORS                                                 TRAUMA RELATED RISK FACTORS  [ ] Prior episodes of VTE                                     (3 Points)                [ ] Fracture of the hip, pelvis, or leg                       (5 Points)  [ ] Positive family history for VTE                         (3 Points)             [ ] Acute spinal cord injury (in the previous month)  (5 Points)  [ ] Prothrombin 20477 A                                     (3 Points)                [ ] Paralysis  (less than 1 month)                             (5 Points)  [ ] Factor V Leiden                                             (3 Points)                   [ ] Multiple Trauma within 1 month                        (5 Points)  [ ] Lupus anticoagulants                                     (3 Points)                                                           [ ] Anticardiolipin antibodies                               (3 Points)                                                       [ ] High homocysteine in the blood                      (3 Points)                                             [ ] Other congenital or acquired thrombophilia      (3 Points)                                                [ ] Heparin induced thrombocytopenia                  (3 Points)                                          Total Score [     7     ]    Caprini Score 0 - 2:  Low Risk, No VTE Prophylaxis required for most patients, encourage ambulation  Caprini Score 3 - 6:  At Risk, pharmacologic VTE prophylaxis is indicated for most patients (in the absence of a contraindication)  Caprini Score Greater than or = 7:  High Risk, pharmacologic VTE prophylaxis is indicated for most patients (in the absence of a contraindication)      81 year old male present today for pst after s/p fall on snow and fracture left ankle.   He is schedule for open reduction internal fixation, left ankle with Dr. Pathak on 2022  Patient educated on written and verbal preop instructions.    Patient verbalized understanding after "teach back" method of instruction were given   Medications reviewed, instructions given on what medications to take and what not to take.  Pt instructed to stop Herbals or anti-inflammatory meds one week prior to surgery and discuss with PMD.

## 2022-02-10 NOTE — ASU PATIENT PROFILE, ADULT - FALL HARM RISK - HARM RISK INTERVENTIONS

## 2022-02-10 NOTE — H&P PST ADULT - NSICDXPASTMEDICALHX_GEN_ALL_CORE_FT
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

## 2022-02-16 ENCOUNTER — TRANSCRIPTION ENCOUNTER (OUTPATIENT)
Age: 82
End: 2022-02-16

## 2022-02-17 ENCOUNTER — OUTPATIENT (OUTPATIENT)
Dept: INPATIENT UNIT | Facility: HOSPITAL | Age: 82
LOS: 1 days | End: 2022-02-17
Payer: MEDICARE

## 2022-02-17 ENCOUNTER — APPOINTMENT (OUTPATIENT)
Dept: ORTHOPEDIC SURGERY | Facility: HOSPITAL | Age: 82
End: 2022-02-17

## 2022-02-17 ENCOUNTER — RESULT REVIEW (OUTPATIENT)
Age: 82
End: 2022-02-17

## 2022-02-17 VITALS
OXYGEN SATURATION: 100 % | DIASTOLIC BLOOD PRESSURE: 84 MMHG | HEART RATE: 81 BPM | TEMPERATURE: 98 F | RESPIRATION RATE: 16 BRPM | WEIGHT: 214.95 LBS | HEIGHT: 70 IN | SYSTOLIC BLOOD PRESSURE: 144 MMHG

## 2022-02-17 VITALS
RESPIRATION RATE: 19 BRPM | SYSTOLIC BLOOD PRESSURE: 128 MMHG | DIASTOLIC BLOOD PRESSURE: 48 MMHG | OXYGEN SATURATION: 98 % | TEMPERATURE: 98 F | HEART RATE: 63 BPM

## 2022-02-17 DIAGNOSIS — S82.892A OTHER FRACTURE OF LEFT LOWER LEG, INITIAL ENCOUNTER FOR CLOSED FRACTURE: ICD-10-CM

## 2022-02-17 DIAGNOSIS — Z90.89 ACQUIRED ABSENCE OF OTHER ORGANS: Chronic | ICD-10-CM

## 2022-02-17 DIAGNOSIS — Z98.890 OTHER SPECIFIED POSTPROCEDURAL STATES: Chronic | ICD-10-CM

## 2022-02-17 LAB — BLD GP AB SCN SERPL QL: SIGNIFICANT CHANGE UP

## 2022-02-17 PROCEDURE — 86900 BLOOD TYPING SEROLOGIC ABO: CPT

## 2022-02-17 PROCEDURE — 76000 FLUOROSCOPY <1 HR PHYS/QHP: CPT

## 2022-02-17 PROCEDURE — 27814 TREATMENT OF ANKLE FRACTURE: CPT | Mod: LT

## 2022-02-17 PROCEDURE — 86850 RBC ANTIBODY SCREEN: CPT

## 2022-02-17 PROCEDURE — C1713: CPT

## 2022-02-17 PROCEDURE — 73610 X-RAY EXAM OF ANKLE: CPT

## 2022-02-17 PROCEDURE — 36415 COLL VENOUS BLD VENIPUNCTURE: CPT

## 2022-02-17 PROCEDURE — 86901 BLOOD TYPING SEROLOGIC RH(D): CPT

## 2022-02-17 PROCEDURE — 27829 TREAT LOWER LEG JOINT: CPT | Mod: AS,LT

## 2022-02-17 PROCEDURE — 77071 MNL APPL STRS JT RADIOGRAPHY: CPT | Mod: AS,LT

## 2022-02-17 PROCEDURE — 73610 X-RAY EXAM OF ANKLE: CPT | Mod: 26,LT

## 2022-02-17 PROCEDURE — 27829 TREAT LOWER LEG JOINT: CPT | Mod: LT

## 2022-02-17 PROCEDURE — 27814 TREATMENT OF ANKLE FRACTURE: CPT | Mod: AS,LT

## 2022-02-17 PROCEDURE — 77071 MNL APPL STRS JT RADIOGRAPHY: CPT | Mod: LT

## 2022-02-17 DEVICE — SCREW CORT S-T 3.5X55MM: Type: IMPLANTABLE DEVICE | Status: FUNCTIONAL

## 2022-02-17 DEVICE — IMPLANTABLE DEVICE: Type: IMPLANTABLE DEVICE | Status: FUNCTIONAL

## 2022-02-17 DEVICE — PLATE 1/3 TUB LCP W/COLLAR 5H 57MM: Type: IMPLANTABLE DEVICE | Status: FUNCTIONAL

## 2022-02-17 RX ORDER — SODIUM CHLORIDE 9 MG/ML
3 INJECTION INTRAMUSCULAR; INTRAVENOUS; SUBCUTANEOUS ONCE
Refills: 0 | Status: DISCONTINUED | OUTPATIENT
Start: 2022-02-17 | End: 2022-02-17

## 2022-02-17 RX ORDER — OXYCODONE HYDROCHLORIDE 5 MG/1
5 TABLET ORAL EVERY 4 HOURS
Refills: 0 | Status: DISCONTINUED | OUTPATIENT
Start: 2022-02-17 | End: 2022-02-17

## 2022-02-17 RX ORDER — ACETAMINOPHEN 500 MG
975 TABLET ORAL ONCE
Refills: 0 | Status: COMPLETED | OUTPATIENT
Start: 2022-02-17 | End: 2022-02-17

## 2022-02-17 RX ORDER — LOSARTAN/HYDROCHLOROTHIAZIDE 100MG-25MG
1 TABLET ORAL
Qty: 0 | Refills: 2 | DISCHARGE

## 2022-02-17 RX ORDER — AMLODIPINE BESYLATE 2.5 MG/1
0 TABLET ORAL
Qty: 0 | Refills: 0 | DISCHARGE

## 2022-02-17 RX ORDER — FENTANYL CITRATE 50 UG/ML
25 INJECTION INTRAVENOUS
Refills: 0 | Status: DISCONTINUED | OUTPATIENT
Start: 2022-02-17 | End: 2022-02-17

## 2022-02-17 RX ORDER — CEFAZOLIN SODIUM 1 G
2000 VIAL (EA) INJECTION ONCE
Refills: 0 | Status: COMPLETED | OUTPATIENT
Start: 2022-02-17 | End: 2022-02-17

## 2022-02-17 RX ADMIN — FENTANYL CITRATE 25 MICROGRAM(S): 50 INJECTION INTRAVENOUS at 10:15

## 2022-02-17 RX ADMIN — FENTANYL CITRATE 25 MICROGRAM(S): 50 INJECTION INTRAVENOUS at 10:28

## 2022-02-17 RX ADMIN — OXYCODONE HYDROCHLORIDE 5 MILLIGRAM(S): 5 TABLET ORAL at 10:20

## 2022-02-17 RX ADMIN — Medication 975 MILLIGRAM(S): at 06:50

## 2022-02-17 RX ADMIN — FENTANYL CITRATE 25 MICROGRAM(S): 50 INJECTION INTRAVENOUS at 10:20

## 2022-02-17 RX ADMIN — Medication 100 MILLIGRAM(S): at 07:38

## 2022-02-17 RX ADMIN — FENTANYL CITRATE 25 MICROGRAM(S): 50 INJECTION INTRAVENOUS at 10:10

## 2022-02-17 NOTE — ASU DISCHARGE PLAN (ADULT/PEDIATRIC) - CARE PROVIDER_API CALL
Elliot Pathak)  Orthopaedic Surgery  [address] [place] [address]  Phone: [phone]  Fax: [phone]  Follow Up Time:

## 2022-02-17 NOTE — ASU DISCHARGE PLAN (ADULT/PEDIATRIC) - NS MD DC FALL RISK RISK
For information on Fall & Injury Prevention, visit: [email or web address] OR  [email or web address] OR  https://www.cdc.gov/steadi/patient.html

## 2022-02-17 NOTE — BRIEF OPERATIVE NOTE - COMMENTS
post-op plan: TTWB, PT/OT, ancef per protocol, contralateral SCD, ASA x 4 weeks post-op , f/u ortho 2-3 weeks, CAM boot, TTWB x 1 month then WBAT in boot

## 2022-02-17 NOTE — BRIEF OPERATIVE NOTE - NSICDXBRIEFPROCEDURE_GEN_ALL_CORE_FT
PROCEDURES:  ORIF, syndesmosis, ankle 17-Feb-2022 09:06:03  Elliot Pathak  ORIF, fracture, ankle, bimalleolar 17-Feb-2022 09:06:20  Elliot Pathak

## 2022-02-17 NOTE — PHYSICAL THERAPY INITIAL EVALUATION ADULT - RANGE OF MOTION EXAMINATION, REHAB EVAL
except left ankle n/a/bilateral upper extremity ROM was WFL (within functional limits)/bilateral lower extremity ROM was WFL (within functional limits)

## 2022-02-18 RX ORDER — ASPIRIN/CALCIUM CARB/MAGNESIUM 324 MG
1 TABLET ORAL
Qty: 28 | Refills: 0
Start: 2022-02-18 | End: 2022-03-17

## 2022-02-22 ENCOUNTER — EMERGENCY (EMERGENCY)
Facility: HOSPITAL | Age: 82
LOS: 1 days | Discharge: HOME | End: 2022-02-22
Attending: EMERGENCY MEDICINE
Payer: MEDICARE

## 2022-02-22 VITALS
TEMPERATURE: 99 F | RESPIRATION RATE: 20 BRPM | HEART RATE: 93 BPM | SYSTOLIC BLOOD PRESSURE: 166 MMHG | OXYGEN SATURATION: 96 % | DIASTOLIC BLOOD PRESSURE: 79 MMHG

## 2022-02-22 VITALS
HEIGHT: 70 IN | WEIGHT: 240.08 LBS | OXYGEN SATURATION: 99 % | SYSTOLIC BLOOD PRESSURE: 132 MMHG | TEMPERATURE: 98 F | RESPIRATION RATE: 16 BRPM | DIASTOLIC BLOOD PRESSURE: 70 MMHG | HEART RATE: 98 BPM

## 2022-02-22 DIAGNOSIS — Z98.890 OTHER SPECIFIED POSTPROCEDURAL STATES: Chronic | ICD-10-CM

## 2022-02-22 DIAGNOSIS — Z90.89 ACQUIRED ABSENCE OF OTHER ORGANS: Chronic | ICD-10-CM

## 2022-02-22 PROBLEM — Z87.898 PERSONAL HISTORY OF OTHER SPECIFIED CONDITIONS: Chronic | Status: ACTIVE | Noted: 2022-02-10

## 2022-02-22 PROBLEM — C61 MALIGNANT NEOPLASM OF PROSTATE: Chronic | Status: ACTIVE | Noted: 2022-02-10

## 2022-02-22 PROBLEM — I10 ESSENTIAL (PRIMARY) HYPERTENSION: Chronic | Status: ACTIVE | Noted: 2022-02-10

## 2022-02-22 PROBLEM — Z86.69 PERSONAL HISTORY OF OTHER DISEASES OF THE NERVOUS SYSTEM AND SENSE ORGANS: Chronic | Status: ACTIVE | Noted: 2022-02-10

## 2022-02-22 LAB
ANION GAP SERPL CALC-SCNC: 14 MMOL/L — SIGNIFICANT CHANGE UP (ref 5–17)
BASOPHILS # BLD AUTO: 0.07 K/UL — SIGNIFICANT CHANGE UP (ref 0–0.2)
BASOPHILS NFR BLD AUTO: 0.7 % — SIGNIFICANT CHANGE UP (ref 0–2)
BUN SERPL-MCNC: 20.1 MG/DL — HIGH (ref 8–20)
CALCIUM SERPL-MCNC: 10 MG/DL — SIGNIFICANT CHANGE UP (ref 8.6–10.2)
CHLORIDE SERPL-SCNC: 101 MMOL/L — SIGNIFICANT CHANGE UP (ref 98–107)
CO2 SERPL-SCNC: 23 MMOL/L — SIGNIFICANT CHANGE UP (ref 22–29)
CREAT SERPL-MCNC: 1.19 MG/DL — SIGNIFICANT CHANGE UP (ref 0.5–1.3)
EOSINOPHIL # BLD AUTO: 0.01 K/UL — SIGNIFICANT CHANGE UP (ref 0–0.5)
EOSINOPHIL NFR BLD AUTO: 0.1 % — SIGNIFICANT CHANGE UP (ref 0–6)
GLUCOSE SERPL-MCNC: 128 MG/DL — HIGH (ref 70–99)
HCT VFR BLD CALC: 36.7 % — LOW (ref 39–50)
HGB BLD-MCNC: 12.6 G/DL — LOW (ref 13–17)
IMM GRANULOCYTES NFR BLD AUTO: 1 % — SIGNIFICANT CHANGE UP (ref 0–1.5)
LYMPHOCYTES # BLD AUTO: 0.88 K/UL — LOW (ref 1–3.3)
LYMPHOCYTES # BLD AUTO: 8.3 % — LOW (ref 13–44)
MCHC RBC-ENTMCNC: 29.4 PG — SIGNIFICANT CHANGE UP (ref 27–34)
MCHC RBC-ENTMCNC: 34.3 GM/DL — SIGNIFICANT CHANGE UP (ref 32–36)
MCV RBC AUTO: 85.7 FL — SIGNIFICANT CHANGE UP (ref 80–100)
MONOCYTES # BLD AUTO: 0.67 K/UL — SIGNIFICANT CHANGE UP (ref 0–0.9)
MONOCYTES NFR BLD AUTO: 6.4 % — SIGNIFICANT CHANGE UP (ref 2–14)
NEUTROPHILS # BLD AUTO: 8.81 K/UL — HIGH (ref 1.8–7.4)
NEUTROPHILS NFR BLD AUTO: 83.5 % — HIGH (ref 43–77)
PLATELET # BLD AUTO: 281 K/UL — SIGNIFICANT CHANGE UP (ref 150–400)
POTASSIUM SERPL-MCNC: 4.7 MMOL/L — SIGNIFICANT CHANGE UP (ref 3.5–5.3)
POTASSIUM SERPL-SCNC: 4.7 MMOL/L — SIGNIFICANT CHANGE UP (ref 3.5–5.3)
RBC # BLD: 4.28 M/UL — SIGNIFICANT CHANGE UP (ref 4.2–5.8)
RBC # FLD: 13.8 % — SIGNIFICANT CHANGE UP (ref 10.3–14.5)
SODIUM SERPL-SCNC: 138 MMOL/L — SIGNIFICANT CHANGE UP (ref 135–145)
WBC # BLD: 10.55 K/UL — HIGH (ref 3.8–10.5)
WBC # FLD AUTO: 10.55 K/UL — HIGH (ref 3.8–10.5)

## 2022-02-22 PROCEDURE — 74177 CT ABD & PELVIS W/CONTRAST: CPT | Mod: MD

## 2022-02-22 PROCEDURE — 99284 EMERGENCY DEPT VISIT MOD MDM: CPT | Mod: 25

## 2022-02-22 PROCEDURE — 85025 COMPLETE CBC W/AUTO DIFF WBC: CPT

## 2022-02-22 PROCEDURE — 99285 EMERGENCY DEPT VISIT HI MDM: CPT

## 2022-02-22 PROCEDURE — 93010 ELECTROCARDIOGRAM REPORT: CPT

## 2022-02-22 PROCEDURE — 80048 BASIC METABOLIC PNL TOTAL CA: CPT

## 2022-02-22 PROCEDURE — 74177 CT ABD & PELVIS W/CONTRAST: CPT | Mod: 26,MD

## 2022-02-22 PROCEDURE — 36415 COLL VENOUS BLD VENIPUNCTURE: CPT

## 2022-02-22 PROCEDURE — 93005 ELECTROCARDIOGRAM TRACING: CPT

## 2022-02-22 RX ORDER — IOHEXOL 300 MG/ML
30 INJECTION, SOLUTION INTRAVENOUS ONCE
Refills: 0 | Status: COMPLETED | OUTPATIENT
Start: 2022-02-22 | End: 2022-02-22

## 2022-02-22 RX ADMIN — IOHEXOL 30 MILLILITER(S): 300 INJECTION, SOLUTION INTRAVENOUS at 20:09

## 2022-02-22 NOTE — ED PROVIDER NOTE - PROGRESS NOTE DETAILS
Jona CURRAN: Manual disimpaction performed by me, chaperone Juan Pablo Quijano RN. Small amount of stool removed. Larger BM after soap suds enema. Patient reports improvement in symptoms. Return precautions given.

## 2022-02-22 NOTE — ED PROVIDER NOTE - NSFOLLOWUPINSTRUCTIONS_ED_ALL_ED_FT
- Follow up with your primary care doctor within 2-3 days, bring your results with you.  - For constipation we also recommend a diet high in fiber (beans, fruits, vegetables, whole grains).    - Please take Docusate (also called colace) 100mg 1-2 times a day as needed. This medication is available over the counter.   - Take Miralax 1 capfull in a cup of water nightly until you run clear.  - Return to the Emergency Department for any new or worsening symptoms.     Constipation    Constipation is when a person has fewer than three bowel movements a week, has difficulty having a bowel movement, or has stools that are dry, hard, or larger than normal. Other symptoms can include abdominal pain or bloating. As people grow older, constipation is more common. A low-fiber diet, not taking in enough fluids, and taking certain medicines, including opioid painkillers, may make constipation worse. Treatment varies but may include dietary modifications (more fiber-rich foods), lifestyle modifications, and possible medications.     SEEK IMMEDIATE MEDICAL CARE IF YOU HAVE ANY OF THE FOLLOWING SYMPTOMS: bright red blood in your stool, constipation for longer than 4 days, abdominal or rectal pain, unexplained weight loss, or inability to pass gas.

## 2022-02-22 NOTE — ED ADULT TRIAGE NOTE - CHIEF COMPLAINT QUOTE
constipation x 3 days. started percocet over the past week for pain from ankle fracture. reports had near syncopal episode while trying to have bowel movement pta.

## 2022-02-22 NOTE — ED PROVIDER NOTE - MDM ORDERS SUBMITTED SELECTION
Plan in the ER was to see Dr. Kumar after the ER visit for a follow up. It doesn't look like patient has done so to discuss plans with surgery. She needs to see the surgery team first, then they will indicate if she needs a preop evaluation.   Labs/Imaging Studies/Medications

## 2022-02-22 NOTE — ED PROVIDER NOTE - PATIENT PORTAL LINK FT
You can access the FollowMyHealth Patient Portal offered by [facility] by registering at the following website: [email or web address]. By joining [facility]’s FollowMyHealth portal, you will also be able to view your health information using other applications (apps) compatible with our system.

## 2022-02-22 NOTE — ED ADULT NURSE NOTE - OBJECTIVE STATEMENT
Pt is an 81YOM who is here for constipation x 3 days, pt states that he had surgeyr on the 15th at [place], pt states that he has had bowel problems since, pt states he was put on an opioid for pain relief in the left ankle, but now he cannot have a BM. Pt states he has been passing gas, denies any nausea or vomiting, denies any diarrhea, pt states he just has pain.

## 2022-02-22 NOTE — ED PROVIDER NOTE - NSICDXPASTMEDICALHX_GEN_ALL_CORE_FT
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

## 2022-02-22 NOTE — ED ADULT TRIAGE NOTE - ARRIVAL FROM
Discussion/Summary   Your cholesterol level is very high off of medication.  I am going to recommend that we try a different statin instead at a lower dose.  I will call a prescription in for Crestor instead of atorvastatin.        Verified Results  LIPID PNL W/ RFX [id number] 11:55AM LIA PEREZ     Test Name Result Flag Reference   FASTING STATUS 12 hrs     CHOLESTEROL 216 mg/dl H <200   Desirable            <200  Borderline High      200 to 239  High                 >=240   HDL CHOLESTEROL 30 mg/dl L >39   Low            <40  Borderline Low 40 to 49  Near Optimal   50 to 59  Optimal        >=60   TRIGLYCERIDES 160 mg/dl H <150   Normal                   <150  Borderline High          150 to 199  High                     200 to 499  Very High                >=500   LDL CHOLESTEROL (CALCULATED) 154 mg/dl H <130   OPTIMAL               <100  NEAR OPTIMAL          100-129  BORDERLINE HIGH       130-159  HIGH                  160-189  VERY HIGH             >=190   NON-HDL CHOLESTEROL 186 mg/dl     Therapeutic Target:  CHD and risk equivalents <130  Multiple risk factors    <160  0 to 1 risk factors      <190   CHOLESTEROL/HDL RATIO 7.2 H <4.5       
Home

## 2022-02-22 NOTE — ED PROVIDER NOTE - OBJECTIVE STATEMENT
82 y/o male with PMHx of HTN, prostate cancer, s/p ORIF on left ankle presents to the ED c/o constipation. Patient reports worsening constipation for x4 days. Patient is s/p ORIF on left ankle and has been taking percocet for 1 week. Patient reports near syncope during BM PTA and vomited afterwards. Patient is currently not taking stool oftener. Denies consistent vomiting, diarrhea, fever, SOB, CP, abdominal distention, or abdominal pain.

## 2022-02-22 NOTE — ED ADULT NURSE NOTE - NSIMPLEMENTINTERV_GEN_ALL_ED
Implemented All Fall with Harm Risk Interventions:  [place] to call system. Call bell, personal items and telephone within reach. Instruct patient to call for assistance. Room bathroom lighting operational. Non-slip footwear when patient is off stretcher. Physically safe environment: no spills, clutter or unnecessary equipment. Stretcher in lowest position, wheels locked, appropriate side rails in place. Provide visual cue, wrist band, yellow gown, etc. Monitor gait and stability. Monitor for mental status changes and reorient to person, place, and time. Review medications for side effects contributing to fall risk. Reinforce activity limits and safety measures with patient and family. Provide visual clues: red socks.

## 2022-02-22 NOTE — ED PROVIDER NOTE - PHYSICAL EXAMINATION
Gen: Well appearing in NAD  Head: NC/AT  Neck: trachea midline  Card: regular rate and rhythm  Resp:  CTAB  Abd: soft, non-tender  Ext: no deformities  Neuro:  A&O appears non focal  Skin:  Warm and dry as visualized  Psych:  Normal affect and mood

## 2022-03-08 ENCOUNTER — APPOINTMENT (OUTPATIENT)
Dept: ORTHOPEDIC SURGERY | Facility: CLINIC | Age: 82
End: 2022-03-08

## 2022-03-08 ENCOUNTER — APPOINTMENT (OUTPATIENT)
Dept: ORTHOPEDIC SURGERY | Facility: CLINIC | Age: 82
End: 2022-03-08
Payer: MEDICARE

## 2022-03-08 PROCEDURE — 73610 X-RAY EXAM OF ANKLE: CPT | Mod: 26,LT

## 2022-03-08 PROCEDURE — 99024 POSTOP FOLLOW-UP VISIT: CPT

## 2022-03-08 NOTE — HISTORY OF PRESENT ILLNESS
[Clean/Dry/Intact] : clean, dry and intact [Neuro Intact] : an unremarkable neurological exam [Vascular Intact] : ~T peripheral vascular exam normal [Xray (Date:___)] : [unfilled] Xray -  [Hardware in Good Position] : hardware in good position [Doing Well] : is doing well [Excellent Pain Control] : has excellent pain control [No Sign of Infection] : is showing no signs of infection [Chills] : no chills [Fever] : no fever [Erythema] : not erythematous [Discharge] : absent of discharge [Swelling] : not swollen [Dehiscence] : not dehisced [de-identified] : s/p open reduction and internal fixation of left bimalleolar ankle fracture. 2/17/22 [de-identified] : 82 yo m s/p open reduction and internal fixation of left bimalleolar ankle fracture. 2/17/22 here for pop visit. Patient denies pain at this time. He is not taling any pain meds.  He has been putting weight on the foot for the last couple of days when standing without pain.  [de-identified] : Patient was fitted with CAM boot [de-identified] : xrays left ankle 2 views [de-identified] : Patient  can start physical therapy.  He should continue CAM boot, may remove boot for hygiene and ROM at PT.  \par He will call office for any concerns, otherwise he will return to office in 1 month for eval and xrays left ankle.

## 2022-04-12 ENCOUNTER — APPOINTMENT (OUTPATIENT)
Dept: ORTHOPEDIC SURGERY | Facility: CLINIC | Age: 82
End: 2022-04-12
Payer: MEDICARE

## 2022-04-12 DIAGNOSIS — S82.892D OTHER FRACTURE OF LEFT LOWER LEG, SUBSEQUENT ENCOUNTER FOR CLOSED FRACTURE WITH ROUTINE HEALING: ICD-10-CM

## 2022-04-12 PROCEDURE — 99024 POSTOP FOLLOW-UP VISIT: CPT

## 2022-04-12 PROCEDURE — 73610 X-RAY EXAM OF ANKLE: CPT | Mod: LT

## 2022-04-12 NOTE — HISTORY OF PRESENT ILLNESS
[Clean/Dry/Intact] : clean, dry and intact [Healed] : healed [Erythema] : not erythematous [Discharge] : absent of discharge [Swelling] : swollen [Neuro Intact] : an unremarkable neurological exam [Vascular Intact] : ~T peripheral vascular exam normal [Xray (Date:___)] : [unfilled] Xray -  [Hardware in Good Position] : hardware in good position [Good Overall Alignment] : good overall alignment [Slow Progress] : is progressing slowly [No Sign of Infection] : is showing no signs of infection [Adequate Pain Control] : has adequate pain control [None] : None [de-identified] : s/p open reduction and internal fixation of left bimalleolar ankle fracture. 2/17/22.  [de-identified] : 80 yo m s/p open reduction and internal fixation of left bimalleolar ankle fracture. 2/17/22 here for pop visit. Patient denies pain at this time. He is not taking any pain meds.  [de-identified] : 3 views left ankle [de-identified] : PT for AAROM, PROM, gait training, strengthening, no restrictions. f/u PRN

## 2022-04-29 NOTE — ED ADULT NURSE NOTE - CAS DISCH ACCOMP BY
The following is a summary of your office visit today:    Lab in 1 week  Start Hyzaar 1 pill every day   Check BP at home and let us know in 2 weeks  Completely stop Metoprolol and Potassium Chloride  Follow up in 3 months         Nurse contact information:  Cardiology Care Coordinators:  Missy Weir, RN and Lopez Jaimes, RN   Phone  [phone]    Fax [phone]       HOW TO CHECK YOUR BLOOD PRESSURE AT HOME:     Avoid eating, smoking, and exercising for at least 30 minutes before taking a reading.    Be sure you have taken your BP medication at least 2-3 hours before you check it.     Sit quietly for 10 minutes before a reading.     Sit in a chair with your feet flat on the floor. Rest your  arm on a table so that the arm cuff is at the same level as your heart.    Remain still during the reading.    Record your blood pressure and pulse in a log and bring to your next appointment.     If you have had any blood work, imaging or other testing completed we will be in touch within 1-2 weeks regarding the results. If you have any questions, concerns or need to schedule a follow up, please contact us at [phone]. If you are needing refills please contact your pharmacy. For urgent after hour care please call the [facility] at [phone] or the [facility] at [phone] and ask to speak to the cardiologist on call.    It was a pleasure meeting with you today. Please let us know if there is anything else we can do for you so that we can be sure you are leaving completely satisfied with your care experience.     Your Cardiology Team at [facility]  RN Care Coordinators: Shilpa  Support Staff: Maria Ines           Self

## 2023-07-31 NOTE — ED PROVIDER NOTE - PRO INTERPRETER NEED 2
- Noted to have O2 sat of 89-92% on room air  - Instructed patient to get up and move to chair from bed. O2 improved to 96-98%  - Saturating 97%-98% on room air consistently (07/27-07/30),  - US DVT is negative  - CXR on 07/28: Bibasilar infiltrates with associated small pleural reactions. This is greater on the right.  finding likely due to IVF hydration. Fluids d/c on 07/29  - Initial episodes of hypoxia likely due to pain however now combination of splinting and pleural processes.   - Duonebs PRN English - Continue home Amlodipine 5 mg PO daily

## 2024-02-20 NOTE — BRIEF OPERATIVE NOTE - NSICDXBRIEFPOSTOP_GEN_ALL_CORE_FT
<<-----Click here for Discharge Medication Review
POST-OP DIAGNOSIS:  Ankle fracture 17-Feb-2022 09:07:42  Elliot Pathak

## 2025-06-19 NOTE — H&P PST ADULT - NS MD HP INPLANTS MED DEV
None Take 1 tablet by mouth daily, Disp: , Rfl:     omeprazole (PRILOSEC) 20 MG delayed release capsule, Take 1 capsule by mouth daily, Disp: , Rfl:     amLODIPine (NORVASC) 5 MG tablet, Take 1 tablet by mouth 2 times daily, Disp: , Rfl:     simvastatin (ZOCOR) 20 MG tablet, Take 1.5 tablets by mouth nightly, Disp: , Rfl:     denosumab (PROLIA) 60 MG/ML SOLN SC injection, Inject 1 mL into the skin every 6 months, Disp: , Rfl:      Allergies   Allergen Reactions    Tetracyclines & Related Rash    Erythromycin Diarrhea    Levofloxacin     Meloxicam      Ineffective, did not help the pain    Tramadol      Causes Hallucinations        REVIEW OF SYSTEMS    General:  Negativeor Change in Weight, Negativefor Fever   Eyes:   Negative for glaucoma, Negative for Cataracts, Negativefor Glasses  ENT:   Negative for Trouble Swallowing, Negative for Nose Bleeds, Negative for Dentures, Negative for Sinus Problems  Cardiac:  Negative for Chest Pain, Negative for Irregular Heart Beat, Negative for Heart failure, Negative for Angina, Negative for Murmur, Negative for High Blood Pressure, Negative for Pain in the legs w/exercise, Negative for Blood Clots, Negative for Leg Swelling  Respiratory:  Negative for Shortness of Breath, Negative for Cough/Wheezing, Negativefor Asthma, Negative for Other Lung Problems  Heme/Lymph:  Negative for Swollen Nodes/Glands, Negative for Bleeding Problems, Negative for Anemia  Musculoskeletal:   Negative for Joint Replacement, Negative for Broken Bones  GI:  Negative  for Gallbladder, Negative  for Blood in Stool, Negative for Diarrhea, Negative Intestinal Bleeding, Negative for Poor Appetite, Negative for Hiatal Hernia, Negative for Ulcer, Negative for Hemorrhoids, Negative for Nausea/Vomiting, Negative for Constipation  G/U:  NegativeNegative for Pain/Burning, Negative for Urinary Frequency/Urgency, Negative for Blood in Urine, Negative for Slow/Small Stream, Negative for poor Bladder Emptying, Negative